# Patient Record
Sex: MALE | Race: WHITE
[De-identification: names, ages, dates, MRNs, and addresses within clinical notes are randomized per-mention and may not be internally consistent; named-entity substitution may affect disease eponyms.]

---

## 2020-05-21 ENCOUNTER — HOSPITAL ENCOUNTER (OUTPATIENT)
Dept: HOSPITAL 47 - EC | Age: 40
Setting detail: OBSERVATION
LOS: 1 days | Discharge: HOME | End: 2020-05-22
Attending: FAMILY MEDICINE | Admitting: FAMILY MEDICINE
Payer: COMMERCIAL

## 2020-05-21 DIAGNOSIS — K76.0: ICD-10-CM

## 2020-05-21 DIAGNOSIS — E66.9: ICD-10-CM

## 2020-05-21 DIAGNOSIS — Z86.14: ICD-10-CM

## 2020-05-21 DIAGNOSIS — R74.8: ICD-10-CM

## 2020-05-21 DIAGNOSIS — I10: ICD-10-CM

## 2020-05-21 DIAGNOSIS — Z91.041: ICD-10-CM

## 2020-05-21 DIAGNOSIS — R16.0: ICD-10-CM

## 2020-05-21 DIAGNOSIS — Z91.048: ICD-10-CM

## 2020-05-21 DIAGNOSIS — R74.0: ICD-10-CM

## 2020-05-21 DIAGNOSIS — F17.210: ICD-10-CM

## 2020-05-21 DIAGNOSIS — K80.00: Primary | ICD-10-CM

## 2020-05-21 DIAGNOSIS — Z98.890: ICD-10-CM

## 2020-05-21 PROCEDURE — 80053 COMPREHEN METABOLIC PANEL: CPT

## 2020-05-21 PROCEDURE — 96374 THER/PROPH/DIAG INJ IV PUSH: CPT

## 2020-05-21 PROCEDURE — 99285 EMERGENCY DEPT VISIT HI MDM: CPT

## 2020-05-21 PROCEDURE — 96376 TX/PRO/DX INJ SAME DRUG ADON: CPT

## 2020-05-21 PROCEDURE — 85025 COMPLETE CBC W/AUTO DIFF WBC: CPT

## 2020-05-21 PROCEDURE — 96375 TX/PRO/DX INJ NEW DRUG ADDON: CPT

## 2020-05-21 PROCEDURE — 80074 ACUTE HEPATITIS PANEL: CPT

## 2020-05-21 PROCEDURE — 87635 SARS-COV-2 COVID-19 AMP PRB: CPT

## 2020-05-21 PROCEDURE — 96361 HYDRATE IV INFUSION ADD-ON: CPT

## 2020-05-21 PROCEDURE — 83690 ASSAY OF LIPASE: CPT

## 2020-05-21 NOTE — ED
Abdominal Pain HPI





- General


Chief Complaint: Abdominal Pain


Stated Complaint: Poss Gallbladder Stones


Time Seen by Provider: 05/21/20 20:38


Source: patient


Mode of arrival: ambulatory


Limitations: no limitations





- History of Present Illness


Initial Comments: 


40-year-old male patient presents to the emergency department today for 

evaluation of midepigastric and right upper quadrant abdominal pain.  Patient 

states the pain has been present for the last 2-3 days.  States he did see his 

doctor who ordered outpatient labs and ultrasound which she did have performed 

earlier today.  Patient states he was called by his doctor and told that he had 

abnormal lab results and ultrasound and instructed him to come to a facility 

with a surgeon.  Patient states he has been feeling nauseated but has not 

vomited.  States he was having light-colored stools.  Denies any constipation or

diarrhea.  He denies any fever or chills with this.  He denies history of ab

dominal surgery. Patient denies any recent rash, cough, shortness of breath, 

chest pain, back pain, numbness, tingling, dizziness, weakness, hematuria, 

dysuria, urinary urgency, urinary frequency, headache, visual changes, or any 

other complaints.








- Related Data


                                Home Medications











 Medication  Instructions  Recorded  Confirmed


 


ALPRAZolam [Xanax] 0.25 mg PO BID PRN 05/21/20 05/21/20











                                    Allergies











Allergy/AdvReac Type Severity Reaction Status Date / Time


 


Iodinated Contrast Media Allergy  Anaphylaxis Verified 05/21/20 22:30


 


iodine Allergy  Anaphylaxis Verified 05/21/20 22:30














Review of Systems


ROS Statement: 


Those systems with pertinent positive or pertinent negative responses have been 

documented in the HPI.





ROS Other: All systems not noted in ROS Statement are negative.





Past Medical History


Past Medical History: Hypertension


History of Any Multi-Drug Resistant Organisms: MRSA


Date of last positivie culture/infection: 02 /2020


MDRO Source:: rectal


Additional Past Surgical History / Comment(s): rectal surgery


Past Psychological History: No Psychological Hx Reported


Smoking Status: Current every day smoker


Past Alcohol Use History: None Reported


Past Drug Use History: Marijuana





General Exam


Limitations: no limitations


General appearance: alert, in no apparent distress, other (This is a well-

developed, well-nourished adult male patient in no acute distress.  Vital signs 

upon presentation are temperature 98.7F, pulse 102)


ENT exam: Present: normal exam, normal oropharynx, mucous membranes moist


Respiratory exam: Present: normal lung sounds bilaterally.  Absent: respiratory 

distress, wheezes, rales, rhonchi, stridor


Cardiovascular Exam: Present: regular rate, normal rhythm, normal heart sounds. 

 Absent: systolic murmur, diastolic murmur, rubs, gallop, clicks


GI/Abdominal exam: Present: soft, tenderness (Midepigastric and right upper quad

rant tenderness.), normal bowel sounds, other (Negative Barragan sign.).  Absent: 

distended, guarding, rebound, rigid


Neurological exam: Present: alert, oriented X3, CN II-XII intact


Psychiatric exam: Present: normal affect, normal mood


Skin exam: Present: warm, dry, intact, normal color.  Absent: rash





Course


                                   Vital Signs











  05/21/20 05/21/20





  20:21 22:12


 


Temperature 98.7 F 


 


Pulse Rate 102 H 88


 


Respiratory 20 18





Rate  


 


Blood Pressure 161/82 131/78


 


O2 Sat by Pulse 97 95





Oximetry  














Medical Decision Making





- Medical Decision Making


40-year-old male patient presents to the emergency department today for evaluati

on of upper abdominal pain, midepigastric and right upper quadrant.  Physical 

examination did reveal tenderness over the midepigastric and right upper 

quadrant regions.  Patient did have labs and ultrasound performed outpatient at 

[facility] this morning. I did obtain records, US showed multiple 

gallstones, gall bladder wall is upper limits of normal. Hepatomegaly and 

hepatic steatosis. Labs were reviewed and showed elevated wbc count at 14.6, AST

 252, , Alk phos 145. Patient will be admitted to the hospital for 

further evaluation of his abdominal pain and repeat labs in the morning. 








Disposition


Clinical Impression: 


 Abdominal pain, Cholelithiasis





Disposition: ADMITTED AS IP TO THIS HOSP


Condition: Serious


Decision to Admit Reason: Admit from EC


Decision Date: 05/21/20


Decision Time: 22:08

## 2020-05-22 VITALS
RESPIRATION RATE: 18 BRPM | HEART RATE: 82 BPM | DIASTOLIC BLOOD PRESSURE: 62 MMHG | TEMPERATURE: 98.5 F | SYSTOLIC BLOOD PRESSURE: 104 MMHG

## 2020-05-22 LAB
ALBUMIN SERPL-MCNC: 3.8 G/DL (ref 3.5–5)
ALP SERPL-CCNC: 111 U/L (ref 38–126)
ALT SERPL-CCNC: 283 U/L (ref 4–49)
ANION GAP SERPL CALC-SCNC: 5 MMOL/L
AST SERPL-CCNC: 104 U/L (ref 17–59)
BASOPHILS # BLD AUTO: 0 K/UL (ref 0–0.2)
BASOPHILS NFR BLD AUTO: 0 %
BUN SERPL-SCNC: 13 MG/DL (ref 9–20)
CALCIUM SPEC-MCNC: 9.2 MG/DL (ref 8.4–10.2)
CHLORIDE SERPL-SCNC: 104 MMOL/L (ref 98–107)
CO2 SERPL-SCNC: 30 MMOL/L (ref 22–30)
EOSINOPHIL # BLD AUTO: 0.2 K/UL (ref 0–0.7)
EOSINOPHIL NFR BLD AUTO: 2 %
ERYTHROCYTE [DISTWIDTH] IN BLOOD BY AUTOMATED COUNT: 4.98 M/UL (ref 4.3–5.9)
ERYTHROCYTE [DISTWIDTH] IN BLOOD: 13.4 % (ref 11.5–15.5)
GLUCOSE SERPL-MCNC: 104 MG/DL (ref 74–99)
HCT VFR BLD AUTO: 48.2 % (ref 39–53)
HGB BLD-MCNC: 15.4 GM/DL (ref 13–17.5)
LYMPHOCYTES # SPEC AUTO: 2.5 K/UL (ref 1–4.8)
LYMPHOCYTES NFR SPEC AUTO: 18 %
MCH RBC QN AUTO: 30.9 PG (ref 25–35)
MCHC RBC AUTO-ENTMCNC: 31.9 G/DL (ref 31–37)
MCV RBC AUTO: 96.8 FL (ref 80–100)
MONOCYTES # BLD AUTO: 0.7 K/UL (ref 0–1)
MONOCYTES NFR BLD AUTO: 5 %
NEUTROPHILS # BLD AUTO: 9.9 K/UL (ref 1.3–7.7)
NEUTROPHILS NFR BLD AUTO: 72 %
PLATELET # BLD AUTO: 244 K/UL (ref 150–450)
POTASSIUM SERPL-SCNC: 5 MMOL/L (ref 3.5–5.1)
PROT SERPL-MCNC: 7 G/DL (ref 6.3–8.2)
SODIUM SERPL-SCNC: 139 MMOL/L (ref 137–145)
WBC # BLD AUTO: 13.9 K/UL (ref 3.8–10.6)

## 2020-05-22 NOTE — DS
DISCHARGE SUMMARY



CHIEF COMPLAINT:

Abdominal pain.



HISTORY OF PRESENT ILLNESS AND PHYSICAL EXAMINATION:

Details of this man's history and physical can be found in the initial workup.



LABORATORY STUDIES:

While he was in the hospital he had laboratory studies, details of which can be found

in the laboratory section of his chart.



COURSE IN THE HOSPITAL:

After admission he was placed on bedrest and started on intravenous fluids, and he was

kept n.p.o. He was seen by Surgery.  It was confirmed that he had cholecystitis and

cholelithiasis.  Surgery felt that he could be discharged home and undergo surgery in

the near future as an outpatient.



FINAL DIAGNOSIS:

Acute cholecystitis with cholelithiasis.



OPERATIONS:

None.



CONSULTATION:

General Surgery.



He is improved.





MMODL / IJN: [id number] / Job#: [id number]

## 2020-05-22 NOTE — P.GSCN
<Naomy Stewart - Last Filed: 05/22/20 09:19>





History of Present Illness


Consult date: 05/22/20


Reason for Consult: 





abdominal pain





Requesting physician: Ledy Guzman


History of present illness: 





CHIEF COMPLAINT: Abdominal pain





HISTORY OF PRESENT ILLNESS: 40-year-old male who began having epigastric and 

right upper quadrant abdominal pain on Wednesday.  Patient states he had Abbie's

and about 15 minutes after he began having discomfort.  He states he went to lay

down for a few hours and the pain did get a little better.  He states the next 

day he woke up and had a light colored bowel movement which worried him.  He 

contacted his physician who ordered an ultrasound and blood work.  The patient 

reports having this discomfort intermittently for approximately one year.  He 

does report the pain seems to correspond to eating greasy or fatty foods. Denies

dark urine. Reports very rare alcohol use. Denies history of hepatitis.





PAST MEDICAL HISTORY: 


See list.





PAST SURGICAL HISTORY: 


See list.





SOCIAL HISTORY: No illicit drug use.  





REVIEW OF SYSTEMS: 


CONSTITUTIONAL: Denies fever or chills.


HEENT: Denies blurred vision, vision changes, or eye pain. Denies hemoptysis 


CARDIOVASCULAR: Denies chest pain or pressure.


RESPIRATORY: No shortness of breath. 


GASTROINTESTINAL: Refer to HPI for pertinent findings


HEMATOLOGIC: Denies bleeding disorders.


GENITOURINARY:  Denies any blood in urine.


SKIN: Denies pruitis. Denies rash.





PHYSICAL EXAM: 


VITAL SIGNS: Reviewed.


GENERAL: Well-developed in no acute distress. 


HEENT:  No sclera icterus. Extraocular movements grossly intact.  Moist buccal 

mucosa. Head is atraumatic, normocephalic. 


ABDOMEN:  Soft.  Nondistended.  Mild tenderness upon palpation of right upper 

quadrant.


NEUROLOGIC: Alert and oriented. Cranial nerves II through XII grossly intact.





LABORATORY DATA:


Laboratory data from outside facility reveals white count 14.6.  Hemoglobin 

15.9.  Platelet count 264.


.  .  Bilirubin 0.4.


Amylase and lipase within normal limits.





IMAGING:


Abdominal ultrasound: Gallbladder full of shadowing gallstones.  Largest 

measuring approximately 1.7 cm.  Gallbladder wall on the upper normal thickness 

at 0.3 cm.  No pericholecystic fluid.  No dilation of the common bile duct.





ASSESSMENT: 


1.  Abdominal pain


2.  Cholelithiasis, transaminitis, possible acute cholecystitis





PLAN: 


NPO. Continue IV fluids at 100cc/hr


Zosyn Q8 hours. Monitor CBC


Await repeat CMP 


Plan for lap jason. Timing yet to be determined.





Nurse practitioner note has been reviewed by physician. Signing provider agrees 

with the documented findings, assessment, and plan of care. 








Past Medical History


Past Medical History: Hypertension


History of Any Multi-Drug Resistant Organisms: MRSA


Year Discovered:: 02 /2020


MDRO Source:: rectal


Additional Past Surgical History / Comment(s): Rectal surgery - polynatal cyst 

on tailbone, hernia repair as a kid


Past Psychological History: No Psychological Hx Reported


Smoking Status: Current every day smoker


Past Alcohol Use History: None Reported


Past Drug Use History: Marijuana





- Past Family History


  ** Father


History Unknown: Yes





Medications and Allergies


                                Home Medications











 Medication  Instructions  Recorded  Confirmed  Type


 


ALPRAZolam [Xanax] 0.25 mg PO BID PRN 05/21/20 05/21/20 History


 


Amoxicillin/Potassium Clav 1 tab PO BID 7 Days #14 tab 05/22/20  Rx





[Augmentin 875-125 Tablet]    








                                    Allergies











Allergy/AdvReac Type Severity Reaction Status Date / Time


 


Iodinated Contrast Media Allergy  Anaphylaxis Verified 05/21/20 22:30


 


iodine Allergy  Anaphylaxis Verified 05/21/20 22:30














Surgical - Exam


                                   Vital Signs











Temp Pulse Resp BP Pulse Ox


 


 98.7 F   102 H  20   161/82   97 


 


 05/21/20 20:21  05/21/20 20:21  05/21/20 20:21  05/21/20 20:21  05/21/20 20:21














<Ismael Nelson - Last Filed: 05/22/20 13:41>





History of Present Illness


History of present illness: 


As above.  Patient with elevated liver enzymes suspicious for 

choledocholithiasis.  Feels much better today.  He notices urine was light 

colored yesterday.  Did not appreciate the color of his urine.  History of 

intermittent episodes happening almost monthly.  Ultrasound shows gallstones 

with a normal bile duct.  Today's liver enzymes improved.  Both alkaline 

phosphatase and bilirubin normal.  Options reviewed with the patient.  He would 

like to go home today if possible and return next week for elective 

cholecystectomy.  I think that is reasonable given the improvement in his liver 

enzymes and his improvement in symptoms.  Will schedule for elective 

laparoscopic cholecystectomy Tuesday.  We'll repeat CMP on arrival. Risks of 

bleeding, infection, bile leak, bile duct injury, retained common bile duct 

stone, trocar injury, conversion to an open procedure, hernia, anesthesia 

related complications were reviewed.  The patient understands and wishes to 

proceed.








Surgical - Exam


                                   Vital Signs











Temp Pulse Resp BP Pulse Ox


 


 98.7 F   102 H  20   161/82   97 


 


 05/21/20 20:21  05/21/20 20:21  05/21/20 20:21  05/21/20 20:21  05/21/20 20:21














Results





- Labs





                                 05/22/20 09:23





                                 05/22/20 09:23


                  Abnormal Lab Results - Last 24 Hours (Table)











  05/22/20 05/22/20 Range/Units





  09:23 09:23 


 


WBC  13.9 H   (3.8-10.6)  k/uL


 


Neutrophils #  9.9 H   (1.3-7.7)  k/uL


 


Glucose   104 H  (74-99)  mg/dL


 


AST   104 H  (17-59)  U/L


 


ALT   283 H  (4-49)  U/L








                                 Diabetes panel











  05/22/20 Range/Units





  09:23 


 


Sodium  139  (137-145)  mmol/L


 


Potassium  5.0  (3.5-5.1)  mmol/L


 


Chloride  104  ()  mmol/L


 


Carbon Dioxide  30  (22-30)  mmol/L


 


BUN  13  (9-20)  mg/dL


 


Creatinine  0.77  (0.66-1.25)  mg/dL


 


Glucose  104 H  (74-99)  mg/dL


 


Calcium  9.2  (8.4-10.2)  mg/dL


 


AST  104 H  (17-59)  U/L


 


ALT  283 H  (4-49)  U/L


 


Alkaline Phosphatase  111  ()  U/L


 


Total Protein  7.0  (6.3-8.2)  g/dL


 


Albumin  3.8  (3.5-5.0)  g/dL








                                  Calcium panel











  05/22/20 Range/Units





  09:23 


 


Calcium  9.2  (8.4-10.2)  mg/dL


 


Albumin  3.8  (3.5-5.0)  g/dL








                                 Pituitary panel











  05/22/20 Range/Units





  09:23 


 


Sodium  139  (137-145)  mmol/L


 


Potassium  5.0  (3.5-5.1)  mmol/L


 


Chloride  104  ()  mmol/L


 


Carbon Dioxide  30  (22-30)  mmol/L


 


BUN  13  (9-20)  mg/dL


 


Creatinine  0.77  (0.66-1.25)  mg/dL


 


Glucose  104 H  (74-99)  mg/dL


 


Calcium  9.2  (8.4-10.2)  mg/dL








                                  Adrenal panel











  05/22/20 Range/Units





  09:23 


 


Sodium  139  (137-145)  mmol/L


 


Potassium  5.0  (3.5-5.1)  mmol/L


 


Chloride  104  ()  mmol/L


 


Carbon Dioxide  30  (22-30)  mmol/L


 


BUN  13  (9-20)  mg/dL


 


Creatinine  0.77  (0.66-1.25)  mg/dL


 


Glucose  104 H  (74-99)  mg/dL


 


Calcium  9.2  (8.4-10.2)  mg/dL


 


Total Bilirubin  0.5  (0.2-1.3)  mg/dL


 


AST  104 H  (17-59)  U/L


 


ALT  283 H  (4-49)  U/L


 


Alkaline Phosphatase  111  ()  U/L


 


Total Protein  7.0  (6.3-8.2)  g/dL


 


Albumin  3.8  (3.5-5.0)  g/dL

## 2020-05-22 NOTE — HP
HISTORY AND PHYSICAL



CHIEF COMPLAINT:

Abdominal pain.



HISTORY OF PRESENT ILLNESS:

This is the first known admission for this 40-year-old obese male.  He states he

presented to the emergency room after a 2- or 3-day history of upper abdominal pain

which was starting to get worse.  He then developed nausea and vomiting.  When he came

to the emergency room there was a suggestion that this was cholecystitis with

cholelithiasis, and he was admitted.



REVIEW OF SYSTEMS:

He has had no neurologic problems, headaches, difficulty with vision or hearing, chest

pain, shortness of breath, cough, pulmonary disease, hypertension, palpitations, heart

disease, murmurs, rheumatic fever, orthopnea, PND, hematemesis, melena, hematochezia,

cirrhosis, jaundice, etc.  He did notice that he had a "gray stool."  He has had no

renal failure, renal disease, hematuria, frequency, urgency, dysuria, incontinence,

diabetes, etc.



Past medical history, family history, and personal and social histories are

unremarkable and noncontributory otherwise. The only medication he is on is Xanax.  He

is ALLERGIC TO IODINE.  The only surgery he has had is 3 different procedures for a

pilonidal cyst.  He smokes a pack of cigarettes a day.



PHYSICAL EXAMINATION:

Blood pressure 145/85 with a pulse of 69, respirations of 32, and he is afebrile. In

general he appeared to be obese.  Skin was slightly pale.  There was no jaundice.

Head, ears, eyes, nose, mouth and throat were normal.  The chest was clear.  Cardiac

exam was normal. The abdomen was protuberant.  He was tender over the epigastrium.

There were no definite masses. Bowel sounds were present. Extremities were normal.

Neurologically he was intact.



He is admitted to the hospital with the diagnoses:

1. Upper abdominal pain, etiology unknown.

2. Rule out gallbladder disease, pancreatitis or other etiology.



PLAN:

1. Bed rest.

2. IV fluids.

3. Follow-up abdominal exam.

4. Consult with General Surgery.





MMODL / IJN: [id number] / Job#: [id number]

## 2020-05-26 ENCOUNTER — HOSPITAL ENCOUNTER (OUTPATIENT)
Dept: HOSPITAL 47 - OR | Age: 40
Discharge: HOME | End: 2020-05-26
Attending: SURGERY
Payer: COMMERCIAL

## 2020-05-26 VITALS — HEART RATE: 91 BPM | DIASTOLIC BLOOD PRESSURE: 61 MMHG | SYSTOLIC BLOOD PRESSURE: 104 MMHG

## 2020-05-26 VITALS — RESPIRATION RATE: 16 BRPM

## 2020-05-26 DIAGNOSIS — Z86.14: ICD-10-CM

## 2020-05-26 DIAGNOSIS — Z91.048: ICD-10-CM

## 2020-05-26 DIAGNOSIS — K80.10: Primary | ICD-10-CM

## 2020-05-26 DIAGNOSIS — G47.33: ICD-10-CM

## 2020-05-26 DIAGNOSIS — I10: ICD-10-CM

## 2020-05-26 DIAGNOSIS — F17.200: ICD-10-CM

## 2020-05-26 DIAGNOSIS — Z98.890: ICD-10-CM

## 2020-05-26 DIAGNOSIS — E66.9: ICD-10-CM

## 2020-05-26 DIAGNOSIS — Z91.041: ICD-10-CM

## 2020-05-26 LAB
ALBUMIN SERPL-MCNC: 4.5 G/DL (ref 3.5–5)
ALP SERPL-CCNC: 102 U/L (ref 38–126)
ALT SERPL-CCNC: 81 U/L (ref 4–49)
ANION GAP SERPL CALC-SCNC: 11 MMOL/L
AST SERPL-CCNC: 32 U/L (ref 17–59)
BUN SERPL-SCNC: 20 MG/DL (ref 9–20)
CALCIUM SPEC-MCNC: 10 MG/DL (ref 8.4–10.2)
CHLORIDE SERPL-SCNC: 104 MMOL/L (ref 98–107)
CO2 SERPL-SCNC: 27 MMOL/L (ref 22–30)
GLUCOSE SERPL-MCNC: 112 MG/DL (ref 74–99)
POTASSIUM SERPL-SCNC: 4.9 MMOL/L (ref 3.5–5.1)
PROT SERPL-MCNC: 8.1 G/DL (ref 6.3–8.2)
SODIUM SERPL-SCNC: 142 MMOL/L (ref 137–145)

## 2020-05-26 PROCEDURE — 47562 LAPAROSCOPIC CHOLECYSTECTOMY: CPT

## 2020-05-26 PROCEDURE — 88304 TISSUE EXAM BY PATHOLOGIST: CPT

## 2020-05-26 PROCEDURE — 80053 COMPREHEN METABOLIC PANEL: CPT

## 2020-05-26 NOTE — P.GSHP
History of Present Illness


H&P Date: 05/26/20


Chief Complaint: Choledocholithiasis





Please refer to consult from last week.  Patient was in the hospital with right 

upper quadrant and epigastric pain.  Was found to have elevated liver enzymes.  

Liver enzymes were improving.  Patient's pain was likewise resolving.  He was 

discharged home with plans for outpatient cholecystectomy.  Returns to the 

hospital today for laparoscopic cholecystectomy.  Pain still improved.  Labs 

from today are pending.  Previous workup showed multiple gallstones on 

ultrasound.  Hepatitis studies negative.





Past Medical History


Past Medical History: Hypertension


History of Any Multi-Drug Resistant Organisms: MRSA


Date of last positivie culture/infection: 02 /2020


MDRO Source:: rectal


Additional Past Surgical History / Comment(s): Rectal surgery - polynatal cyst 

on tailbone, hernia repair as a kid


Past Psychological History: No Psychological Hx Reported


Smoking Status: Current every day smoker


Past Alcohol Use History: None Reported


Past Drug Use History: Marijuana





- Past Family History


  ** Father


History Unknown: Yes





Medications and Allergies


                                Home Medications











 Medication  Instructions  Recorded  Confirmed  Type


 


ALPRAZolam [Xanax] 0.25 mg PO BID PRN 05/21/20 05/21/20 History


 


Amoxicillin/Potassium Clav 1 tab PO BID 7 Days #14 tab 05/22/20  Rx





[Augmentin 875-125 Tablet]    








                                    Allergies











Allergy/AdvReac Type Severity Reaction Status Date / Time


 


Iodinated Contrast Media Allergy  Anaphylaxis Verified 05/21/20 22:30


 


iodine Allergy  Anaphylaxis Verified 05/21/20 22:30














Surgical - Exam








Physical exam:


General: Well-developed, well-nourished


HEENT: Normocephalic, sclerae nonicteric


Abdomen: Nontender, nondistended


Extremities: No edema


Neuro: Alert and oriented





Assessment and Plan


(1) Choledocholithiasis


Narrative/Plan: 


40-year-old male with suspected choledocholithiasis.  Check CMP level at this 

time.  Proceed with laparoscopic, possible open cholecystectomy at this time. 

Risks of bleeding, infection, bile leak, bile duct injury, retained common bile 

duct stone, trocar injury, conversion to an open procedure, hernia, anesthesia 

related complications were reviewed.  The patient understands and wishes to 

proceed.


Current Visit: Yes   Status: Acute   Code(s): K80.50 - CALCULUS OF BILE DUCT W/O

CHOLANGITIS OR CHOLECYST W/O OBST   SNOMED Code(s): 627029184

## 2020-05-26 NOTE — P.OP
Date of Procedure: 05/26/20


Procedure(s) Performed: 


PREOPERATIVE DIAGNOSIS: Choledocholithiasis


POSTOPERATIVE DIAGNOSIS: Same


PROCEDURE: Laparoscopic cholecystectomy


SURGEON: Omar


EBL: Minimal see anesthesia record


ANESTHESIA: Gen.


COMPLICATIONS: None


OPERATIVE PROCEDURE: The patient was brought and placed on the operating room 

table in the supine position.  The patient was placed under general anesthesia 

at that time.  The abdomen was prepped and draped in the usual sterile fashion. 

A small horizontal supraumbilical incision was made.  The fascia was grasped 

with the Kocher forceps.  The fascia was retracted anteriorly.  The Veress 

needle was advanced into the peritoneal cavity.  The saline drop test was 

normal.  Insufflation took place up to 15 mmHg.  A 5 mm optical trocar was 

advanced and the peritoneal cavity.  2 additional 5 mm trochars were placed in 

the right upper quadrant under direct visualization.  A 12 mm trocar was 

advanced into the epigastric incision site.  The patient's liver was quite 

large.  We could not visualize stomach or liver given these significant volume 

of intraperitoneal omental fat.  An additional 5 mm trocar was utilized in the 

left supraumbilical location to retract the omental fat inferiorly and 

posteriorly.  The gallbladder was retracted superiorly and laterally.  The 

peritoneum overlying the infundibulum was bluntly dissected.  The patient's 

cystic duct was visualized.  The junction between the cystic duct common and 

hepatic duct was identified.  The cystic duct was then divided after placement 

of 3 12 mm clips on the patient's side and one on the specimen side.  The cystic

artery was identified and clipped as well.  A small vessel was seen along the 

gallbladder fossa and clipped as well.  The gallbladder was then removed from 

the liver bed using both electrocautery and the LigaSure device.  The 

gallbladder was then removed from the epigastric trocar site with an Endo Catch 

bag.  The gallbladder fossa was irrigated with saline.  There was no evidence of

any bleeding or biliary drainage seen.  The fascia at the 12 millimeter site was

closed using a Wm-Jj 0 Vicryl stitch.  The trochars were then removed.

 The skin at all 5 sites was closed using a 4-0 Monocryl stitch.  Skin glue was 

utilized on the incision sites.  At the end of this procedure the sponge and 

needle counts were correct.


DISPOSITION: Stable to the recovery room

## 2020-08-30 ENCOUNTER — HOSPITAL ENCOUNTER (INPATIENT)
Dept: HOSPITAL 47 - 3SCARD | Age: 40
LOS: 3 days | Discharge: HOME | DRG: 247 | End: 2020-09-02
Attending: INTERNAL MEDICINE | Admitting: INTERNAL MEDICINE
Payer: COMMERCIAL

## 2020-08-30 VITALS — BODY MASS INDEX: 51.2 KG/M2

## 2020-08-30 DIAGNOSIS — Z87.892: ICD-10-CM

## 2020-08-30 DIAGNOSIS — I11.9: ICD-10-CM

## 2020-08-30 DIAGNOSIS — G47.30: ICD-10-CM

## 2020-08-30 DIAGNOSIS — E66.01: ICD-10-CM

## 2020-08-30 DIAGNOSIS — D72.829: ICD-10-CM

## 2020-08-30 DIAGNOSIS — E78.5: ICD-10-CM

## 2020-08-30 DIAGNOSIS — I25.10: ICD-10-CM

## 2020-08-30 DIAGNOSIS — Z98.890: ICD-10-CM

## 2020-08-30 DIAGNOSIS — Z71.3: ICD-10-CM

## 2020-08-30 DIAGNOSIS — F17.210: ICD-10-CM

## 2020-08-30 DIAGNOSIS — Z87.19: ICD-10-CM

## 2020-08-30 DIAGNOSIS — Z86.14: ICD-10-CM

## 2020-08-30 DIAGNOSIS — N17.9: ICD-10-CM

## 2020-08-30 DIAGNOSIS — Z71.6: ICD-10-CM

## 2020-08-30 DIAGNOSIS — I21.4: Primary | ICD-10-CM

## 2020-08-30 DIAGNOSIS — Z91.041: ICD-10-CM

## 2020-08-30 LAB
APTT BLD: 88 SEC (ref 22–30)
BASOPHILS # BLD AUTO: 0.1 K/UL (ref 0–0.2)
BASOPHILS NFR BLD AUTO: 1 %
CHOLEST SERPL-MCNC: 227 MG/DL (ref ?–200)
EOSINOPHIL # BLD AUTO: 0.5 K/UL (ref 0–0.7)
EOSINOPHIL NFR BLD AUTO: 3 %
ERYTHROCYTE [DISTWIDTH] IN BLOOD BY AUTOMATED COUNT: 5.51 M/UL (ref 4.3–5.9)
ERYTHROCYTE [DISTWIDTH] IN BLOOD: 13.4 % (ref 11.5–15.5)
HCT VFR BLD AUTO: 52.1 % (ref 39–53)
HDLC SERPL-MCNC: 25 MG/DL (ref 40–60)
HGB BLD-MCNC: 16.4 GM/DL (ref 13–17.5)
INR PPP: 1 (ref ?–1.2)
LDLC SERPL CALC-MCNC: 170 MG/DL (ref 0–99)
LYMPHOCYTES # SPEC AUTO: 3.5 K/UL (ref 1–4.8)
LYMPHOCYTES NFR SPEC AUTO: 22 %
MCH RBC QN AUTO: 29.7 PG (ref 25–35)
MCHC RBC AUTO-ENTMCNC: 31.4 G/DL (ref 31–37)
MCV RBC AUTO: 94.6 FL (ref 80–100)
MONOCYTES # BLD AUTO: 1 K/UL (ref 0–1)
MONOCYTES NFR BLD AUTO: 7 %
NEUTROPHILS # BLD AUTO: 10.4 K/UL (ref 1.3–7.7)
NEUTROPHILS NFR BLD AUTO: 66 %
PLATELET # BLD AUTO: 293 K/UL (ref 150–450)
PT BLD: 10.7 SEC (ref 9–12)
TRIGL SERPL-MCNC: 161 MG/DL (ref ?–150)
WBC # BLD AUTO: 15.9 K/UL (ref 3.8–10.6)

## 2020-08-30 PROCEDURE — 80048 BASIC METABOLIC PNL TOTAL CA: CPT

## 2020-08-30 PROCEDURE — 93306 TTE W/DOPPLER COMPLETE: CPT

## 2020-08-30 PROCEDURE — 85610 PROTHROMBIN TIME: CPT

## 2020-08-30 PROCEDURE — B2111ZZ FLUOROSCOPY OF MULTIPLE CORONARY ARTERIES USING LOW OSMOLAR CONTRAST: ICD-10-PCS

## 2020-08-30 PROCEDURE — 71045 X-RAY EXAM CHEST 1 VIEW: CPT

## 2020-08-30 PROCEDURE — 85379 FIBRIN DEGRADATION QUANT: CPT

## 2020-08-30 PROCEDURE — 83735 ASSAY OF MAGNESIUM: CPT

## 2020-08-30 PROCEDURE — 84484 ASSAY OF TROPONIN QUANT: CPT

## 2020-08-30 PROCEDURE — 027034Z DILATION OF CORONARY ARTERY, ONE ARTERY WITH DRUG-ELUTING INTRALUMINAL DEVICE, PERCUTANEOUS APPROACH: ICD-10-PCS

## 2020-08-30 PROCEDURE — 85730 THROMBOPLASTIN TIME PARTIAL: CPT

## 2020-08-30 PROCEDURE — 93454 CORONARY ARTERY ANGIO S&I: CPT

## 2020-08-30 PROCEDURE — 80061 LIPID PANEL: CPT

## 2020-08-30 PROCEDURE — 85025 COMPLETE CBC W/AUTO DIFF WBC: CPT

## 2020-08-30 PROCEDURE — 4A023N7 MEASUREMENT OF CARDIAC SAMPLING AND PRESSURE, LEFT HEART, PERCUTANEOUS APPROACH: ICD-10-PCS

## 2020-08-30 RX ADMIN — HEPARIN SODIUM SCH MLS/HR: 10000 INJECTION, SOLUTION INTRAVENOUS at 14:44

## 2020-08-30 RX ADMIN — METOPROLOL TARTRATE SCH MG: 25 TABLET, FILM COATED ORAL at 20:10

## 2020-08-30 RX ADMIN — FAMOTIDINE SCH MG: 10 INJECTION, SOLUTION INTRAVENOUS at 20:10

## 2020-08-30 RX ADMIN — METOPROLOL TARTRATE SCH MG: 25 TABLET, FILM COATED ORAL at 16:43

## 2020-08-30 RX ADMIN — HEPARIN SODIUM SCH: 10000 INJECTION, SOLUTION INTRAVENOUS at 14:43

## 2020-08-30 RX ADMIN — NICOTINE SCH PATCH: 21 PATCH, EXTENDED RELEASE TRANSDERMAL at 16:43

## 2020-08-30 NOTE — P.CRDCN
History of Present Illness


History of present illness: 





tightness in the chest through to the back and into the left arm and shoulder. 

happened after he laid down to sleep this morning after work. lasted 

approximately 5 minutes. 


HISTORY OF PRESENTING ILLNESS


This is a pleasant 40-year-old  male past medical history significant 

for hypertension although not taking antihypertensives, chronic nicotine 

dependence and marijuana use.  He denies prior history of coronary artery 

disease and does not follow with a cardiologist for any reason.  He denies 

family history of premature coronary artery disease. We have been asked to see 

in consultation for chest pain with elevated troponins.  He works the midnight 

shift and states yesterday morning when he got home from work while he laid down

getting ready to fall asleep.  Noticed a tightness in his chest in the 

midsternal region radiating through to his back into the left shoulder and down 

the left arm.  It lasted for approximately 5 minutes and subsided on its own.  

He went to sleep AND went to work last night without incident.  Again this 

morning when he got home from work and lay down to sleep he had a similar 

episode of chest discomfort.  He was on his way to the emergency department and 

the chest pain subsided promptly him to turn back around to go home.  Only been 

home for about 5 minutes he sat down on the couch in his chest pain returned.  

He went back to the hospital for evaluation.  On arrival to the emergency 

department his EKG was unremarkable revealing sinus mechanism with no acute 

ischemic changes.  His troponin value was elevated at 0.112.  He was transferred

here from [facility] for further cardiac evaluation.  He is seen and 

examined resting comfortably laying flat in bed in no acute distress.  He has 

had no further symptoms of chest discomfort since this morning.  He states 

presently 3 years ago he was diagnosed with hypertension and started on 

lisinopril which she took for approximately 6 months and then his blood pressure

improved and this was discontinued according to the patient by his PCP.  He has 

never had a cardiac catheterization or stress testing in the outpatient setting.

 Other pertinent laboratory values reviewed, WBC 15.9, hemoglobin 16.4, 

platelets 293 second troponin obtained here 0.154, , HDL 25, triglyceride

161, total cholesterol 227, magnesium 2.0, sodium 140, potassium 4.3 and 

creatinine of 1.4.





REVIEW OF SYSTEMS


At the time of my exam:


CONSTITUTIONAL: Denies fever or chills.


CARDIOVASCULAR: Denies chest pain, shortness of breath, orthopnea, PND or 

palpitations.


RESPIRATORY: Denies cough. 


GASTROINTESTINAL: Denies abdominal pain, diarrhea, constipation, nausea or 

vomiting.


MUSCULOSKELETAL: Denies myalgias.


NEUROLOGIC: Denies numbness, tingling or weakness.


ENDOCRINE: Denies fatigue, weight change,  polydipsia or polyurina.


GENITOURINARY: Denies burning, hematuria or urgency with micturation.


HEMATOLOGIC: Denies history of anemia or bleeding. 





PHYSICAL EXAMINATION


Blood pressure 120s over 80s heart rate 67 afebrile and maintaining oxygen 

saturation on room air.


CONSTITUTIONAL: No apparent distress.  Morbidly obese.


HEENT: Head is normocephalic. Pupils are equal, round. Sclerae anicteric. Mucous

membranes of the mouth are moist.  No JVD. No carotid bruit.


CHEST EXAMINATION: Lungs are clear to auscultation. No chest wall tenderness is 

noted on palpation or with deep breathing. 


HEART EXAMINATION: Regular rate and rhythm. S1, S2 heard. No murmurs, gallops or

rub.


ABDOMEN: Soft, nontender. Positive bowel sounds.


EXTREMITIES: 2+ peripheral pulses, no lower extremity edema and no calf 

tenderness.


NEUROLOGIC EXAMINATION: Patient is awake, alert and oriented x3. 





ASSESSMENT


Non-ST elevated myocardial infarction


Hypertension


Leukocytosis


Acute kidney injury


Chronic nicotine dependence


Dyslipidemia


Morbid obesity, BMI 47





PLAN


Initiate aspirin 81 mg daily, atorvastatin 80 mg daily and Lopressor 25 mg twice

a day.


Continue heparin infusion and Nitropaste as needed for chest pain.


Recommend proceeding with cardiac catheterization to assess coronary arteries. I

have discussed the risks, benefits and alternative therapies for the above-

mentioned procedure and for both sedation/analgesia as well as necessary blood 

product administration, if indicated, as they pertain to this patient.  The 

patient has indicated understanding and acceptance of the risks and procedures 

discussed. Questions have been answered appropriately and he is agreeable to 

proceed with above stated procedure. 


Obtain 2D echocardiogram and doppler study to assess cardiac structure and 

function. 


Further recommendations to follow based on clinical course. We will start an ACE

inhibitor if his blood pressure will tolerate. 


Smoking cessation recommended. 


Thank you kindly for this consultation.











Nurse Practitioner note has been reviewed, I agree with a documented findings 

and plan of care.  Patient was seen and examined.











Past Medical History


Past Medical History: Hypertension


History of Any Multi-Drug Resistant Organisms: MRSA


Date of last positivie culture/infection: 02 /2020


MDRO Source:: rectal


Additional Past Surgical History / Comment(s): Rectal surgery - polynatal cyst 

on tailbone, hernia repair as a kid


Past Psychological History: No Psychological Hx Reported


Past Alcohol Use History: None Reported


Past Drug Use History: Marijuana





- Past Family History


  ** Father


History Unknown: Yes





Medications and Allergies


                                Home Medications











 Medication  Instructions  Recorded  Confirmed  Type


 


ALPRAZolam [Xanax] 0.25 mg PO BID PRN 05/21/20 08/30/20 History


 


Ibuprofen [Motrin Ib] 800 mg PO Q6H PRN 08/30/20 08/30/20 History








                                    Allergies











Allergy/AdvReac Type Severity Reaction Status Date / Time


 


Iodinated Contrast Media Allergy  Anaphylaxis Verified 08/30/20 13:13


 


iodine Allergy  Anaphylaxis Verified 08/30/20 13:13














Physical Exam


Vitals: 


                                Intake and Output











 08/29/20 08/30/20 08/30/20





 22:59 06:59 14:59


 


Other:   


 


  Weight   142.882 kg














Results





                                 08/30/20 10:20





                                   Coagulation











  08/30/20 Range/Units





  10:20 


 


PT  10.7  (9.0-12.0)  sec


 


APTT  88.0 H  (22.0-30.0)  sec








                                       CBC











  08/30/20 Range/Units





  10:20 


 


WBC  15.9 H  (3.8-10.6)  k/uL


 


RBC  5.51  (4.30-5.90)  m/uL


 


Hgb  16.4  (13.0-17.5)  gm/dL


 


Hct  52.1  (39.0-53.0)  %


 


Plt Count  293  (150-450)  k/uL








                               Current Medications











Generic Name Dose Route Start Last Admin





  Trade Name Freq  PRN Reason Stop Dose Admin


 


Aspirin  81 mg  08/31/20 09:00 





  Aspirin  PO  





  DAILY [facility]  


 


Atorvastatin Calcium  40 mg  08/30/20 10:15 





  Lipitor  PO  





  DAILY [facility]  


 


Heparin Sodium/Sodium Chloride  250 mls @ 10.002 mls/hr  08/30/20 10:00 





  25,000 unit/ Sodium Chloride  IV  





  .Q24H [facility]  





  Protocol  





  7 UNITS/KG/HR  


 


Metoprolol Tartrate  25 mg  08/30/20 10:15 





  Lopressor  PO  





  BID [facility]  








                                Intake and Output











 08/29/20 08/30/20 08/30/20





 22:59 06:59 14:59


 


Other:   


 


  Weight   142.882 kg








                                 Patient Weight











 08/31/20





 06:59


 


Weight 142.882 kg








                                        





                                 08/30/20 10:20

## 2020-08-30 NOTE — P.HPIM
History of Present Illness








This is a pleasant 40-year-old years old male with past medical history of 

hypertension, not on medication.  Cigarette smoker.


Patient wasn't transferred from [facility].  He presents because of 2 

episodes of chest pain that started yesterday and felt like that this in the 

chest, radiating to the throat with left arm numbness, resolved but recurred 

again this morning at 8:00.  Patient described it as nonspecific tightness 

further that pain.  Little bit with dyspnea but no sweating or vomiting or 

palpitation or dizziness.  No fever or change in urine or bowel habits


He smokes about 1 pack per day, smokes marijuana but no illicit drugs





In [facility] the EKG showing normal sinus rhythm with no significant 

ST-T changes, rate is 83 and QTC is 420.


Left showing INR 1.0, d-dimer is elevated at 0.7, BNP is normal at 22.7 Christal 

troponin is elevated at 0.1, magnesium 2.0, sodium 140, potassium 4.3, ALT is 

normal at 37, AST is normal at 24, creatinine is slightly up at 1.4, GFR is 56, 

total bili 0.3


WBC is elevated at 14.5 K, hemoglobin 16.4, platelets 274k, 


He smokes about 1 pack per day, no alcohol or illicit drugs


Labs checked here and showing troponin 0.15, triglycerides and cholesterol are 

elevated, WBCs 15.9 K.











Review of Systems





CONSTITUTIONAL: No fever, no malaise, no fatigue. 


HEENT: No recent visual problems or hearing problems. Denied any sore throat. 


CARDIOVASCULAR: No  orthopnea, PND, no palpitations, no syncope. 


PULMONARY: No shortness of breath, no cough, no hemoptysis. 


GASTROINTESTINAL: No diarrhea, no nausea, no vomiting, no abdominal pain. [place]

ctive bowel sounds. 


NEUROLOGICAL: No headaches, no weakness, no numbness. 


HEMATOLOGICAL: Denies any bleeding or petechiae. 


GENITOURINARY: Denies any burning micturition, frequency, or urgency. 


MUSCULOSKELETAL/RHEUMATOLOGICAL: Denies any joint pain, swelling, or any muscle 

pain. 


ENDOCRINE: Denies any polyuria or polydipsia.











Past Medical History


Past Medical History: Hypertension


History of Any Multi-Drug Resistant Organisms: MRSA


Date of last positivie culture/infection: 02 /2020


MDRO Source:: rectal


Additional Past Surgical History / Comment(s): Rectal surgery - polynatal cyst 

on tailbone, hernia repair as a kid


Past Psychological History: No Psychological Hx Reported


Past Alcohol Use History: None Reported


Past Drug Use History: Marijuana





- Past Family History


  ** Father


History Unknown: Yes





Medications and Allergies


                                Home Medications











 Medication  Instructions  Recorded  Confirmed  Type


 


ALPRAZolam [Xanax] 0.25 mg PO BID PRN 05/21/20 08/30/20 History








                                    Allergies











Allergy/AdvReac Type Severity Reaction Status Date / Time


 


Iodinated Contrast Media Allergy  Anaphylaxis Verified 08/30/20 11:43


 


iodine Allergy  Anaphylaxis Verified 08/30/20 11:43














Physical Exam


Vitals: 


                                Intake and Output











 08/29/20 08/30/20 08/30/20





 22:59 06:59 14:59


 


Other:   


 


  Weight   142.882 kg














-GENERAL: The patient is alert and oriented x3, not in any acute distress.  

Morbid obesity


HEENT: Pupils are round and equally reacting to light. EOMI. No scleral icterus.

 No conjunctival pallor. Normocephalic, atraumatic. No pharyngeal erythema. No 

thyromegaly. 


CARDIOVASCULAR: S1 and S2 present. No murmurs, rubs, or gallops. 


PULMONARY: Chest is clear to auscultation, no wheezing or crackles. 


ABDOMEN: Soft, nontender, nondistended, normoactive bowel sounds. No palpable 

organomegaly. 


MUSCULOSKELETAL: No joint swelling or deformity. 


EXTREMITIES: No cyanosis, clubbing, or pedal edema. 


NEUROLOGICAL: Gross neurological examination did not reveal any focal deficits. 


SKIN: No rashes. No petechiae








Results


CBC & Chem 7: 


                                 08/30/20 10:20





Labs: 


                  Abnormal Lab Results - Last 24 Hours (Table)











  08/30/20 08/30/20 08/30/20 Range/Units





  10:20 10:20 10:21 


 


WBC  15.9 H    (3.8-10.6)  k/uL


 


Neutrophils #  10.4 H    (1.3-7.7)  k/uL


 


APTT   88.0 H   (22.0-30.0)  sec


 


Troponin I    0.154 H*  (0.000-0.034)  ng/mL














Assessment and Plan


Assessment: 





None STEMI


Hypertension


Mild acute kidney injury


Morbid obesity with BMI of 47


Nicotine dependence





























Plan: 





This is a pleasant 40 years old male who presents with non-STEMI, continue with 

heparin drip.  We'll do.  Serial troponin.  Cardiology consult.  We'll check 

echocardiogram.  Continue with aspirin and Lipitor.


Labs and medication were reviewed..  Continue same treatment.  Continue with 

symptomatic treatment.  Resume home medication.  Monitor lytes and vitals.  DVT 

and GI prophylaxis.  Further recommendations of the clinical course of the 

patient


DVT prophylaxis:  heparin


GI Prophylaxis: Pepcid


Prognosis is guarded

## 2020-08-31 LAB
ANION GAP SERPL CALC-SCNC: 5 MMOL/L
APTT BLD: 31.7 SEC (ref 22–30)
BASOPHILS # BLD AUTO: 0.1 K/UL (ref 0–0.2)
BASOPHILS NFR BLD AUTO: 1 %
BUN SERPL-SCNC: 16 MG/DL (ref 9–20)
CALCIUM SPEC-MCNC: 9.5 MG/DL (ref 8.4–10.2)
CHLORIDE SERPL-SCNC: 102 MMOL/L (ref 98–107)
CO2 SERPL-SCNC: 31 MMOL/L (ref 22–30)
D DIMER PPP FEU-MCNC: 0.45 MG/L FEU (ref ?–0.6)
EOSINOPHIL # BLD AUTO: 0.3 K/UL (ref 0–0.7)
EOSINOPHIL NFR BLD AUTO: 2 %
ERYTHROCYTE [DISTWIDTH] IN BLOOD BY AUTOMATED COUNT: 5.25 M/UL (ref 4.3–5.9)
ERYTHROCYTE [DISTWIDTH] IN BLOOD: 13.5 % (ref 11.5–15.5)
GLUCOSE SERPL-MCNC: 95 MG/DL (ref 74–99)
HCT VFR BLD AUTO: 49.9 % (ref 39–53)
HGB BLD-MCNC: 15.6 GM/DL (ref 13–17.5)
LYMPHOCYTES # SPEC AUTO: 2.6 K/UL (ref 1–4.8)
LYMPHOCYTES NFR SPEC AUTO: 19 %
MCH RBC QN AUTO: 29.7 PG (ref 25–35)
MCHC RBC AUTO-ENTMCNC: 31.3 G/DL (ref 31–37)
MCV RBC AUTO: 95 FL (ref 80–100)
MONOCYTES # BLD AUTO: 0.6 K/UL (ref 0–1)
MONOCYTES NFR BLD AUTO: 5 %
NEUTROPHILS # BLD AUTO: 9.4 K/UL (ref 1.3–7.7)
NEUTROPHILS NFR BLD AUTO: 70 %
PLATELET # BLD AUTO: 264 K/UL (ref 150–450)
POTASSIUM SERPL-SCNC: 5 MMOL/L (ref 3.5–5.1)
SODIUM SERPL-SCNC: 138 MMOL/L (ref 137–145)
WBC # BLD AUTO: 13.5 K/UL (ref 3.8–10.6)

## 2020-08-31 RX ADMIN — FAMOTIDINE SCH MG: 20 TABLET, FILM COATED ORAL at 19:45

## 2020-08-31 RX ADMIN — HEPARIN SODIUM SCH MLS/HR: 10000 INJECTION, SOLUTION INTRAVENOUS at 21:20

## 2020-08-31 RX ADMIN — HEPARIN SODIUM SCH MLS/HR: 10000 INJECTION, SOLUTION INTRAVENOUS at 09:37

## 2020-08-31 RX ADMIN — NICOTINE SCH PATCH: 21 PATCH, EXTENDED RELEASE TRANSDERMAL at 05:48

## 2020-08-31 RX ADMIN — METOPROLOL TARTRATE SCH MG: 25 TABLET, FILM COATED ORAL at 19:45

## 2020-08-31 RX ADMIN — FAMOTIDINE SCH MG: 10 INJECTION, SOLUTION INTRAVENOUS at 05:46

## 2020-08-31 RX ADMIN — ATORVASTATIN CALCIUM SCH MG: 80 TABLET, FILM COATED ORAL at 05:45

## 2020-08-31 RX ADMIN — FAMOTIDINE SCH MG: 20 TABLET, FILM COATED ORAL at 09:36

## 2020-08-31 RX ADMIN — METOPROLOL TARTRATE SCH MG: 25 TABLET, FILM COATED ORAL at 05:45

## 2020-08-31 NOTE — P.PN
Subjective








This is a pleasant 40-year-old years old male with past medical history of 

hypertension, not on medication.  Cigarette smoker.


Patient wasn't transferred from [facility].  He presents because of 2 

episodes of chest pain that started yesterday and felt like that this in the 

chest, radiating to the throat with left arm numbness, resolved but recurred 

again this morning at 8:00.  Patient described it as nonspecific tightness 

further that pain.  Little bit with dyspnea but no sweating or vomiting or 

palpitation or dizziness.  No fever or change in urine or bowel habits


He smokes about 1 pack per day, smokes marijuana but no illicit drugs





In [facility] the EKG showing normal sinus rhythm with no significant 

ST-T changes, rate is 83 and QTC is 420.


Left showing INR 1.0, d-dimer is elevated at 0.7, BNP is normal at 22.7 Christal 

troponin is elevated at 0.1, magnesium 2.0, sodium 140, potassium 4.3, ALT is 

normal at 37, AST is normal at 24, creatinine is slightly up at 1.4, GFR is 56, 

total bili 0.3


WBC is elevated at 14.5 K, hemoglobin 16.4, platelets 274k, 


He smokes about 1 pack per day, no alcohol or illicit drugs


Labs checked here and showing troponin 0.15, triglycerides and cholesterol are 

elevated, WBCs 15.9 K.





08/31/2020


Patient is fully awake and oriented, resting in bed comfortably.  No chest pain 

or dyspnea.  No dizziness and no other new complaints.


Vitals are stable, d-dimer is negative at 0.45 and suspicion for pulmonary 

embolism is very low and no need for further workup as it has more risks than b

enefits


Leukocytosis improvement of 13.5 K.  BMP is unremarkable.


Patient is going for cardiac cath today








Review of Systems


CONSTITUTIONAL: No fever, no malaise, no fatigue. 


HEENT: No recent visual problems or hearing problems. Denied any sore throat. 


CARDIOVASCULAR: No  orthopnea, PND, no palpitations, no syncope. 


PULMONARY: No shortness of breath, no cough, no hemoptysis. 


GASTROINTESTINAL: No diarrhea, no nausea, no vomiting, no abdominal pain. 

Normoactive bowel sounds. 


NEUROLOGICAL: No headaches, no weakness, no numbness. 


HEMATOLOGICAL: Denies any bleeding or petechiae. 


GENITOURINARY: Denies any burning micturition, frequency, or urgency. 


MUSCULOSKELETAL/RHEUMATOLOGICAL: Denies any joint pain, swelling, or any muscle 

pain. 


ENDOCRINE: Denies any polyuria or polydipsia.


                               Active Medications











Generic Name Dose Route Start Last Admin





  Trade Name Freq  PRN Reason Stop Dose Admin


 


Alprazolam  0.25 mg  08/30/20 13:36  08/31/20 09:38





  Xanax  PO   0.25 mg





  Q6HR PRN   Administration





  Mild Anxiety  


 


Alprazolam  0.5 mg  08/30/20 13:36 





  Xanax  PO  





  Q6HR PRN  





  Moderate Anxiety  


 


Aspirin  81 mg  09/01/20 09:00 





  Aspirin  PO  





  DAILY [facility]  


 


Aspirin  325 mg  09/01/20 06:00 





  Aspirin  PO  09/01/20 06:01 





  ONCE ONE  


 


Atorvastatin Calcium  80 mg  08/31/20 09:00  08/31/20 05:45





  Lipitor  PO   80 mg





  DAILY [facility]   Administration


 


Atorvastatin Calcium  80 mg  09/01/20 06:00 





  Lipitor  PO  09/01/20 06:01 





  ONCE ONE  


 


Diphenhydramine HCl  25 mg  08/31/20 09:15  08/31/20 09:35





  Benadryl  PO   25 mg





  TID [facility]   Administration


 


Famotidine  20 mg  08/31/20 09:15  08/31/20 09:36





  Pepcid  PO   20 mg





  BID [facility]   Administration


 


Heparin Sodium/Sodium Chloride  250 mls @ 10.002 mls/hr  08/30/20 10:00  

08/31/20 10:47





  25,000 unit/ Sodium Chloride  IV   16 units/kg/hr





  .Q24H [facility]   22.861 mls/hr





    Titration





  Protocol  





  7 UNITS/KG/HR  


 


Sodium Chloride 1,000 ml/ IV  1,000 mls @ 150.2 mls/hr  08/31/20 09:06  08/31/20

09:36





  Solution  IV  08/31/20 15:45  150.2 mls/hr





  .Q6H40M ONE   Administration





  1 ML/KG/HR  


 


Metoprolol Tartrate  25 mg  08/30/20 10:15  08/31/20 05:45





  Lopressor  PO   25 mg





  BID [facility]   Administration


 


Nicotine  1 patch  08/30/20 12:30  08/31/20 05:48





  Habitrol 21mg/24hr Patch  TRANSDERM   1 patch





  DAILY [facility]   Administration


 


Nitroglycerin  0.4 mg  08/31/20 09:06 





  Nitrostat  SUBLINGUAL  





  Q5M PRN  





  Chest Pain  


 


Prednisone  20 mg  08/31/20 09:15  08/31/20 09:35





  PO   20 mg





  QID [facility]   Administration














Objective





- Vital Signs


Vital signs: 


                                   Vital Signs











Temp  97.8 F   08/31/20 08:00


 


Pulse  67   08/31/20 08:00


 


Resp  16   08/31/20 08:00


 


BP  144/90   08/31/20 08:00


 


Pulse Ox  97   08/31/20 08:00








                                 Intake & Output











 08/30/20 08/31/20 08/31/20





 18:59 06:59 18:59


 


Intake Total 522.509 692.396 136.766


 


Output Total 400  


 


Balance 122.509 692.396 136.766


 


Weight 142.882 kg 150.2 kg 


 


Intake:   


 


  Intake, IV Titration 42.509 692.396 136.766





  Amount   


 


    Heparin Sod,Pork in 0.45% 42.509 92.396 136.766





    NaCl 25,000 unit In 0.45   





    % NaCl 1 250ml.bag @ 7   





    UNITS/KG/HR 10.002 mls/hr   





    IV .Q24H [facility] Rx#:   





    [id number]   


 


    Sodium Chloride 0.9% 1,  600 





    000 ml In Empty Bag 1 bag   





    @ 1 ML/KG/.882 mls   





    /hr IV .Q7H ONE Rx#:   





    [id number]   


 


  Oral 480  


 


Output:   


 


  Urine 400  


 


Other:   


 


  Voiding Method Toilet Toilet 


 


  # Voids 3 1 














- Exam





-GENERAL: The patient is alert and oriented x3, not in any acute distress.  

Morbidly obese


HEENT: Pupils are round and equally reacting to light. EOMI. No scleral icterus.

No conjunctival pallor. Normocephalic, atraumatic. No pharyngeal erythema. No 

thyromegaly. 


CARDIOVASCULAR: S1 and S2 present. No murmurs, rubs, or gallops. 


PULMONARY: Chest is clear to auscultation, no wheezing or crackles. 


ABDOMEN: Soft, nontender, nondistended, normoactive bowel sounds. No palpable 

organomegaly. 


MUSCULOSKELETAL: No joint swelling or deformity. 


EXTREMITIES: No cyanosis, clubbing, or pedal edema. 


NEUROLOGICAL: Gross neurological examination did not reveal any focal deficits. 


SKIN: No rashes. no petechiae.





- Labs


CBC & Chem 7: 


                                 08/31/20 06:32





                                 08/31/20 06:32


Labs: 


                  Abnormal Lab Results - Last 24 Hours (Table)











  08/30/20 08/31/20 08/31/20 Range/Units





  17:16 06:32 06:32 


 


WBC   13.5 H   (3.8-10.6)  k/uL


 


Neutrophils #   9.4 H   (1.3-7.7)  k/uL


 


APTT  30.9 H    (22.0-30.0)  sec


 


Carbon Dioxide    31 H  (22-30)  mmol/L














  08/31/20 Range/Units





  06:32 


 


WBC   (3.8-10.6)  k/uL


 


Neutrophils #   (1.3-7.7)  k/uL


 


APTT  31.7 H  (22.0-30.0)  sec


 


Carbon Dioxide   (22-30)  mmol/L














Assessment and Plan


Assessment: 





None STEMI


Hypertension


Mild acute kidney injury, improved


Morbid obesity with BMI of 47


Nicotine dependence





























Plan: 





This is a pleasant 40 years old male who presents with non-STEMI, continue with 

heparin drip.  Cardiology consult, patient is planned for cardiac cath on 8/31. 

We'll check echocardiogram.  Continue with aspirin and Lipitor.


Labs and medication were reviewed..  Continue same treatment.  Continue with 

symptomatic treatment.  Resume home medication.  Monitor lytes and vitals.  DVT 

and GI prophylaxis.  Further recommendations of the clinical course of the 

patient


DVT prophylaxis:  heparin


GI Prophylaxis: Pepcid


Prognosis is guarded

## 2020-08-31 NOTE — P.PN
Subjective


Progress Note Date: 08/31/20





CHIEF COMPLAINT: Chest pain





HISTORY OF PRESENT ILLNESS:  Patient examined this morning at the bedside.  

Patient was scheduled for cardiac catheterization today with Dr. Darden. 

However, this was cancelled due to iodine allergy with previous anaphylactic 

reaction.  Patient denies chest pain.  He denies shortness of breath.





PHYSICAL EXAM: 


VITAL SIGNS: Reviewed.


GENERAL: Well-developed in no acute distress. 


NECK: Supple. No JVD or thyromegaly


LUNGS: Respirations even and unlabored. Lungs essentially clear to auscultation 

bilaterally.


HEART: Regular rate and rhythm.  S1 and S2 heard.


EXTREMITIES: Normal range of motion.  No clubbing or cyanosis.  Peripheral 

pulses intact.  No lower extremity edema





ASSESSMENT: 


Non-ST elevated myocardial infarction


Hypertension


Leukocytosis


Acute kidney injury


Chronic nicotine dependence


Dyslipidemia


Morbid obesity, BMI 47 





PLAN: 


-Continue current cardiac medications including aspirin, Lipitor, and Lopressor


-Continue IV heparin


-May resume diet today. NPO at midnight


-Begin prednisone 20 mg QID, Benadryl 25 mg TID, and Pepcid 20mg BID for iodine 

allergy


-Echo ordered. Await results.


-Patient will be rescheduled for cardiac cath tomorrow with Dr. Darden





Nurse practitioner note has been reviewed by physician. Signing provider agrees 

with the documented findings, assessment, and plan of care. 








Objective





- Vital Signs


Vital signs: 


                                   Vital Signs











Temp  97.9 F   08/31/20 04:00


 


Pulse  77   08/31/20 04:00


 


Resp  18   08/31/20 04:00


 


BP  155/89   08/31/20 04:00


 


Pulse Ox  96   08/31/20 04:00








                                 Intake & Output











 08/30/20 08/31/20 08/31/20





 18:59 06:59 18:59


 


Intake Total 522.509 692.396 115.095


 


Output Total 400  


 


Balance 122.509 692.396 115.095


 


Weight 142.882 kg 150.2 kg 


 


Intake:   


 


  Intake, IV Titration 42.509 692.396 115.095





  Amount   


 


    Heparin Sod,Pork in 0.45% 42.509 92.396 115.095





    NaCl 25,000 unit In 0.45   





    % NaCl 1 250ml.bag @ 7   





    UNITS/KG/HR 10.002 mls/hr   





    IV [facility]   





    [id number]   


 


    Sodium Chloride 0.9% 1,  600 





    000 ml In Empty Bag 1 bag   





    @ 1 ML/KG/.882 mls   





    /hr IV .Q7H ONE Rx#:   





    [id number]   


 


  Oral 480  


 


Output:   


 


  Urine 400  


 


Other:   


 


  Voiding Method Toilet Toilet 


 


  # Voids 3 1 














- Labs


CBC & Chem 7: 


                                 08/31/20 06:32





                                 08/31/20 06:32


Labs: 


                  Abnormal Lab Results - Last 24 Hours (Table)











  08/30/20 08/30/20 08/30/20 Range/Units





  10:20 10:20 10:21 


 


WBC  15.9 H    (3.8-10.6)  k/uL


 


Neutrophils #  10.4 H    (1.3-7.7)  k/uL


 


APTT   88.0 H   (22.0-30.0)  sec


 


Carbon Dioxide     (22-30)  mmol/L


 


Troponin I     (0.000-0.034)  ng/mL


 


Triglycerides    161 H  (<150)  mg/dL


 


Cholesterol    227 H  (<200)  mg/dL


 


LDL Cholesterol, Calc    170 H  (0-99)  mg/dL


 


HDL Cholesterol    25 L  (40-60)  mg/dL














  08/30/20 08/30/20 08/31/20 Range/Units





  10:21 17:16 06:32 


 


WBC    13.5 H  (3.8-10.6)  k/uL


 


Neutrophils #    9.4 H  (1.3-7.7)  k/uL


 


APTT   30.9 H   (22.0-30.0)  sec


 


Carbon Dioxide     (22-30)  mmol/L


 


Troponin I  0.154 H*    (0.000-0.034)  ng/mL


 


Triglycerides     (<150)  mg/dL


 


Cholesterol     (<200)  mg/dL


 


LDL Cholesterol, Calc     (0-99)  mg/dL


 


HDL Cholesterol     (40-60)  mg/dL














  08/31/20 08/31/20 Range/Units





  06:32 06:32 


 


WBC    (3.8-10.6)  k/uL


 


Neutrophils #    (1.3-7.7)  k/uL


 


APTT   31.7 H  (22.0-30.0)  sec


 


Carbon Dioxide  31 H   (22-30)  mmol/L


 


Troponin I    (0.000-0.034)  ng/mL


 


Triglycerides    (<150)  mg/dL


 


Cholesterol    (<200)  mg/dL


 


LDL Cholesterol, Calc    (0-99)  mg/dL


 


HDL Cholesterol    (40-60)  mg/dL

## 2020-08-31 NOTE — ECHOF
Referral Reason:cp



MEASUREMENTS

--------

HEIGHT: 172.7 cm

WEIGHT: 150.1 kg

BP: 

IVSd:   1.3 cm     (0.6 - 1.1)

LVIDd:   5.6 cm     (3.9 - 5.3)

LVPWd:   1.3 cm     (0.6 - 1.1)

EDV(Teich):   151 ml

IVSs:   1.8 cm

LVIDs:   3.9 cm

LVPWs:   1.7 cm

%IVS Thck:   31 %

ESV(Teich):   67 ml

EF(Teich):   56 %

%FS:   29 %

SV(Teich):   84 ml

LA Diam:   4.1 cm     (2.7 - 3.8)

RVIDd:   3.8 cm     (< 3.3)

Ao Diam:   3.0 cm     (2.0 - 3.7)

AV Cusp:   2.6 cm     (1.5 - 2.6)

EPSS:   0.2 cm

MV E Tyrone:   0.67 m/s

MV DecT:   155 ms

MV Dec Hill:   4.3 m/s

MV A Tyrone:   0.53 m/s

MV E/A Ratio:   1.27 

MV PHT:   45 ms

TR Vmax:   1.02 m/s

TR maxP.13 mmHg

RAP:   5.00 mmHg

RVSP:   9.13 mmHg

MV EF SLOPE:   141.97 mm/s     (70 - 150)

MV EXCURSION:   22.56 mm     (> 18.000)







FINDINGS

--------

Sinus rhythm.

This was a technically adequate study.   Morbid Obesity

The left ventricular size is normal.   There is mild concentric left ventricular hypertrophy.   Overa
ll left ventricular systolic function is low-normal with, an EF between 50 - 55 %.

The right ventricle is normal in size.   The right ventricular wall thickness is normal measuring < 5
mm.

The left atrial size is normal.

The right atrial size is normal.

The aortic valve is trileaflet, and appears structurally normal. No aortic stenosis or regurgitation.


Mild mitral regurgitation is present.

No regurgitation noted   Right ventricular systolic pressure is normal at < 35 mmHg.

The pulmonic valve was not well visualized.

The aortic root size is normal.

There is no pericardial effusion.



CONCLUSIONS

--------

1. The left ventricular size is normal.

2. Overall left ventricular systolic function is low-normal with, an EF between 50 - 55 %.

3. The right atrial size is normal.

4. Mild mitral regurgitation is present.

5. No regurgitation noted





SONOGRAPHER: Rebecca Wilson RDCS

## 2020-09-01 LAB
ANION GAP SERPL CALC-SCNC: 4 MMOL/L
BASOPHILS # BLD AUTO: 0 K/UL (ref 0–0.2)
BASOPHILS NFR BLD AUTO: 0 %
BUN SERPL-SCNC: 15 MG/DL (ref 9–20)
CALCIUM SPEC-MCNC: 9.9 MG/DL (ref 8.4–10.2)
CHLORIDE SERPL-SCNC: 101 MMOL/L (ref 98–107)
CO2 SERPL-SCNC: 30 MMOL/L (ref 22–30)
EOSINOPHIL # BLD AUTO: 0 K/UL (ref 0–0.7)
EOSINOPHIL NFR BLD AUTO: 0 %
ERYTHROCYTE [DISTWIDTH] IN BLOOD BY AUTOMATED COUNT: 5.39 M/UL (ref 4.3–5.9)
ERYTHROCYTE [DISTWIDTH] IN BLOOD: 13.3 % (ref 11.5–15.5)
GLUCOSE SERPL-MCNC: 124 MG/DL (ref 74–99)
HCT VFR BLD AUTO: 50.9 % (ref 39–53)
HGB BLD-MCNC: 15.9 GM/DL (ref 13–17.5)
LYMPHOCYTES # SPEC AUTO: 1.3 K/UL (ref 1–4.8)
LYMPHOCYTES NFR SPEC AUTO: 9 %
MCH RBC QN AUTO: 29.6 PG (ref 25–35)
MCHC RBC AUTO-ENTMCNC: 31.3 G/DL (ref 31–37)
MCV RBC AUTO: 94.5 FL (ref 80–100)
MONOCYTES # BLD AUTO: 0.6 K/UL (ref 0–1)
MONOCYTES NFR BLD AUTO: 4 %
NEUTROPHILS # BLD AUTO: 13.2 K/UL (ref 1.3–7.7)
NEUTROPHILS NFR BLD AUTO: 86 %
PLATELET # BLD AUTO: 282 K/UL (ref 150–450)
POTASSIUM SERPL-SCNC: 5.1 MMOL/L (ref 3.5–5.1)
SODIUM SERPL-SCNC: 135 MMOL/L (ref 137–145)
WBC # BLD AUTO: 15.3 K/UL (ref 3.8–10.6)

## 2020-09-01 RX ADMIN — LIDOCAINE HYDROCHLORIDE ONE ML: 10 INJECTION, SOLUTION INFILTRATION; PERINEURAL at 11:57

## 2020-09-01 RX ADMIN — METOPROLOL TARTRATE SCH: 25 TABLET, FILM COATED ORAL at 20:03

## 2020-09-01 RX ADMIN — NICOTINE SCH PATCH: 21 PATCH, EXTENDED RELEASE TRANSDERMAL at 16:20

## 2020-09-01 RX ADMIN — LIDOCAINE HYDROCHLORIDE ONE ML: 10 INJECTION, SOLUTION INFILTRATION; PERINEURAL at 12:21

## 2020-09-01 RX ADMIN — METOPROLOL TARTRATE SCH MG: 25 TABLET, FILM COATED ORAL at 06:02

## 2020-09-01 RX ADMIN — HYDRALAZINE HYDROCHLORIDE ONE MG: 20 INJECTION INTRAMUSCULAR; INTRAVENOUS at 13:12

## 2020-09-01 RX ADMIN — ATORVASTATIN CALCIUM SCH: 80 TABLET, FILM COATED ORAL at 05:56

## 2020-09-01 RX ADMIN — HEPARIN SODIUM SCH MLS/HR: 10000 INJECTION, SOLUTION INTRAVENOUS at 06:05

## 2020-09-01 RX ADMIN — FAMOTIDINE SCH MG: 20 TABLET, FILM COATED ORAL at 20:07

## 2020-09-01 RX ADMIN — ASPIRIN 81 MG CHEWABLE TABLET SCH: 81 TABLET CHEWABLE at 05:56

## 2020-09-01 RX ADMIN — NICOTINE SCH PATCH: 21 PATCH, EXTENDED RELEASE TRANSDERMAL at 06:02

## 2020-09-01 RX ADMIN — HYDRALAZINE HYDROCHLORIDE ONE MG: 20 INJECTION INTRAMUSCULAR; INTRAVENOUS at 13:20

## 2020-09-01 RX ADMIN — FAMOTIDINE SCH MG: 20 TABLET, FILM COATED ORAL at 06:02

## 2020-09-01 NOTE — PN
PROGRESS NOTE



Mr. Jerez was transferred from an outside facility with elevated troponin and chest

pain.  His cardiac cath was canceled yesterday because of IODINE allergy.  He has been

appropriately pretreated with prednisone, Benadryl and Pepcid.  The patient was advised

cardiac catheterization.  Risks, benefits, options, rationale were explained.  Dr. Darden will perform procedure. He is doing well today.



Vitals are stable, no JVD, S1, S2 heard normally.  Lungs are clear. Abdomen and lower

exam otherwise are unchanged.  He is going for a cardiac cath, possible PCI today.





MMODL / IJN: [id number] / Job#: [id number]

## 2020-09-01 NOTE — P.PN
Subjective








This is a pleasant 40-year-old years old male with past medical history of 

hypertension, not on medication.  Cigarette smoker.


Patient wasn't transferred from [facility].  He presents because of 2 

episodes of chest pain that started yesterday and felt like that this in the 

chest, radiating to the throat with left arm numbness, resolved but recurred 

again this morning at 8:00.  Patient described it as nonspecific tightness 

further that pain.  Little bit with dyspnea but no sweating or vomiting or 

palpitation or dizziness.  No fever or change in urine or bowel habits


He smokes about 1 pack per day, smokes marijuana but no illicit drugs





In [facility] the EKG showing normal sinus rhythm with no significant 

ST-T changes, rate is 83 and QTC is 420.


Left showing INR 1.0, d-dimer is elevated at 0.7, BNP is normal at 22.7 Christal 

troponin is elevated at 0.1, magnesium 2.0, sodium 140, potassium 4.3, ALT is 

normal at 37, AST is normal at 24, creatinine is slightly up at 1.4, GFR is 56, 

total bili 0.3


WBC is elevated at 14.5 K, hemoglobin 16.4, platelets 274k, 


He smokes about 1 pack per day, no alcohol or illicit drugs


Labs checked here and showing troponin 0.15, triglycerides and cholesterol are 

elevated, WBCs 15.9 K.





08/31/2020


Patient is fully awake and oriented, resting in bed comfortably.  No chest pain 

or dyspnea.  No dizziness and no other new complaints.


Vitals are stable, d-dimer is negative at 0.45 and suspicion for pulmonary 

embolism is very low and no need for further workup as it has more risks than b

enefits


Leukocytosis improvement of 13.5 K.  BMP is unremarkable.


Patient is going for cardiac cath today





09/01/2020


Patient Kala bed not in distress, he denies chest pain or dyspnea.  He is 

hemodynamically stable.  Labs showed mild leukocytosis but he is on prednisone 

20 mg for preparation for the cardiac catheter due to ALLERGY.  Patient is going

for cardiac cath today.  Cardiology following the case very closely and he is 

currently on aspirin 81 mg and Effient is added cardiologist


Patient states that his PCP is Mukesh Joiner








Review of Systems


CONSTITUTIONAL: No fever, no malaise, no fatigue. 


HEENT: No recent visual problems or hearing problems. Denied any sore throat. 


CARDIOVASCULAR: No  orthopnea, PND, no palpitations, no syncope. 


PULMONARY: No shortness of breath, no cough, no hemoptysis. 


GASTROINTESTINAL: No diarrhea, no nausea, no vomiting, no abdominal pain. 

Normoactive bowel sounds. 


NEUROLOGICAL: No headaches, no weakness, no numbness. 


HEMATOLOGICAL: Denies any bleeding or petechiae. 


GENITOURINARY: Denies any burning micturition, frequency, or urgency. 


MUSCULOSKELETAL/RHEUMATOLOGICAL: Denies any joint pain, swelling, or any muscle 

pain. 


ENDOCRINE: Denies any polyuria or polydipsia.


                               Active Medications











Generic Name Dose Route Start Last Admin





  Trade Name Freq  PRN Reason Stop Dose Admin


 


Al Hydroxide/Mg Hydroxide  30 ml  09/01/20 13:49 





  Maalox  PO  





  Q4HR PRN  





  Heartburn  


 


Alprazolam  0.25 mg  08/30/20 13:36  08/31/20 09:38





  Xanax  PO   0.25 mg





  Q6HR PRN   Administration





  Mild Anxiety  


 


Alprazolam  0.5 mg  08/30/20 13:36  09/01/20 14:34





  Xanax  PO   0.5 mg





  Q6HR PRN   Administration





  Moderate Anxiety  


 


Aspirin  81 mg  09/01/20 09:00  09/01/20 05:56





  Aspirin  PO   Not Given





  DAILY [facility]  


 


Atorvastatin Calcium  80 mg  08/31/20 09:00  09/01/20 05:56





  Lipitor  PO   Not Given





  DAILY YOVANI  


 


Atropine Sulfate  0.5 mg  09/01/20 13:49 





  Atropine  IV  





  ONCE PRN  





  Symptomatic Bradycardia  


 


Diphenhydramine HCl  25 mg  08/31/20 09:15  09/01/20 14:34





  Benadryl  PO   25 mg





  TID YOVANI   Administration


 


Famotidine  20 mg  08/31/20 09:15  09/01/20 06:02





  Pepcid  PO   20 mg





  BID YOVANI   Administration


 


Heparin Sodium/Sodium Chloride  250 mls @ 10.002 mls/hr  08/30/20 10:00  0

9/01/20 09:15





  25,000 unit/ Sodium Chloride  IV   0 units/kg/hr





  .Q24H YOVANI   0 mls/hr





    Titration





  Protocol  





  7 UNITS/KG/HR  


 


Metoprolol Tartrate  25 mg  08/30/20 10:15  09/01/20 06:02





  Lopressor  PO   25 mg





  BID YOVANI   Administration


 


Miscellaneous Information  1 each  09/01/20 13:49 





  Rx Info: Iv Contrast Was Given  MISCELLANE  09/03/20 13:49 





  DAILY PRN  





  Per Protocol  


 


Nicotine  1 patch  08/30/20 12:30  09/01/20 16:20





  Habitrol 21mg/24hr Patch  TRANSDERM   1 patch





  DAILY YOVANI   Administration


 


Nitroglycerin  0.4 mg  08/31/20 09:06 





  Nitrostat  SUBLINGUAL  





  Q5M PRN  





  Chest Pain  


 


Prasugrel  10 mg  09/02/20 09:00 





  Effient  PO  





  DAILY YOVANI  


 


Prednisone  20 mg  08/31/20 09:15  09/01/20 17:09





  PO   20 mg





  QID YOVANI   Administration


 


Zolpidem Tartrate  5 mg  09/01/20 13:49 





  Ambien  PO  





  HS PRN  





  Insomnia  














Objective





- Vital Signs


Vital signs: 


                                   Vital Signs











Temp  97.6 F   09/01/20 16:00


 


Pulse  71   09/01/20 16:00


 


Resp  18   09/01/20 16:00


 


BP  130/78   09/01/20 16:00


 


Pulse Ox  97   09/01/20 16:00








                                 Intake & Output











 08/31/20 09/01/20 09/01/20





 18:59 06:59 18:59


 


Intake Total 2306.766 3956.848 3554.444


 


Balance 2306.766 2814.108 9113.444


 


Weight  149.9 kg 


 


Intake:   


 


  IV   150


 


  Intake, IV Titration 6884.829 1568.370 1056.444





  Amount   


 


    Heparin Sod,Pork in 0.45% 136.766 453.370 81.444





    NaCl 25,000 unit In 0.45   





    % NaCl 1 250ml.bag @ 7   





    UNITS/KG/HR 10.002 mls/hr   





    IV .Q24H [facility] Rx#:   





    [id number]   


 


    Sodium Chloride 0.9% 1, 1200 1200 975





    000 ml In Empty Bag 1 bag   





    @ 1 ML/KG/.2 mls/   





    hr IV .Q6H40M ONE Rx#:   





    [id number]   


 


  Oral 970  


 


Other:   


 


  Voiding Method Toilet Toilet Toilet


 


  # Voids  2 














- Exam





-GENERAL: The patient is alert and oriented x3, not in any acute distress.  

Morbidly obese


HEENT: Pupils are round and equally reacting to light. EOMI. No scleral icterus.

No conjunctival pallor. Normocephalic, atraumatic. No pharyngeal erythema. No 

thyromegaly. 


CARDIOVASCULAR: S1 and S2 present. No murmurs, rubs, or gallops. 


PULMONARY: Chest is clear to auscultation, no wheezing or crackles. 


ABDOMEN: Soft, nontender, nondistended, normoactive bowel sounds. No palpable 

organomegaly. 


MUSCULOSKELETAL: No joint swelling or deformity. 


EXTREMITIES: No cyanosis, clubbing, or pedal edema. 


NEUROLOGICAL: Gross neurological examination did not reveal any focal deficits. 


SKIN: No rashes. no petechiae.





- Labs


CBC & Chem 7: 


                                 09/01/20 02:34





                                 09/01/20 02:34


Labs: 


                  Abnormal Lab Results - Last 24 Hours (Table)











  08/31/20 09/01/20 09/01/20 Range/Units





  18:51 02:34 02:34 


 


WBC   15.3 H   (3.8-10.6)  k/uL


 


Neutrophils #   13.2 H   (1.3-7.7)  k/uL


 


APTT  41.3 H    (22.0-30.0)  sec


 


Sodium    135 L  (137-145)  mmol/L


 


Glucose    124 H  (74-99)  mg/dL














  09/01/20 Range/Units





  02:34 


 


WBC   (3.8-10.6)  k/uL


 


Neutrophils #   (1.3-7.7)  k/uL


 


APTT  48.3 H  (22.0-30.0)  sec


 


Sodium   (137-145)  mmol/L


 


Glucose   (74-99)  mg/dL














Assessment and Plan


Assessment: 





None STEMI


Hypertension


Mild acute kidney injury, improved


Morbid obesity with BMI of 47


Nicotine dependence





























Plan: 





This is a pleasant 40 years old male who presents with non-STEMI, continue with 

heparin drip.  Cardiology consult, patient is planned for cardiac cath on 8/31. 

We'll check echocardiogram.  Continue with aspirin and Lipitor.


Labs and medication were reviewed..  Continue same treatment.  Continue with 

symptomatic treatment.  Resume home medication.  Monitor lytes and vitals.  DVT 

and GI prophylaxis.  Further recommendations of the clinical course of the 

patient


DVT prophylaxis:  heparin


GI Prophylaxis: Pepcid


Prognosis is guarded

## 2020-09-01 NOTE — P.PRCINT
Percutaneous Coronary Int.





- Percutaneous Coronary Intervention


Percutaneous Coronary Intervention: 


Date of procedure: 9/1/2020


Procedure performed by: Dr. Ge Perkins


Procedure performed: PCI of distal RCA with a 40 x 18 mm science drug-eluting 

stent, Angioseal closure


Indication: NSTEMI, CAD, tobacco abuse





History: Patient is a 40-year-old male with history of hypertension, chronic 

tobacco abuse and marijuana use who presented on 8/30/2020 with left arm and 

shoulder pain.  Patient was found to have a non-STEMI with recommendations for 

heart catheterization.








Description of procedure: After the above-mentioned risks, benefits and 

alternatives were discussed in detail with the patient, informed consent was 

obtained.   The patient had been prepped and draped in the usual fashion and 

right femoral arterial axis have been obtained with a 6 Peruvian sheath placed.  

The patient had a diagnostic left heart catheterization performed by my partner 

which showed severe distal right coronary artery stenosis of approximately 85%. 

See full left heart catheterization report for details.  I was asked to evaluate

the patient and given low syntax score and single vessel disease planned for a 

PCI of the RCA.  A 6 Peruvian AL 0.75 guide was used to engage the right coronary 

artery.  Heparin was given for an ACT over 250.  A 0.014 BMW wire was used to 

cross the lesion.  The lesion was predilated with a 3.5 x 8 mm balloon.  Next a 

4.0 x 18 mm Xience stent was deployed in the distal RCA.  The middle of the 

stent was postdilated with a 4.0 x 8 mm noncompliant balloon.  Final angiograms 

were obtained with and without the wire.  Preintervention there was 85% stenosis

with LUCHO-3 flow and post intervention there was 0% residual stenosis with LUCHO 

3 flow and no significant dissection noted.  The guide and wire were then 

removed.  A right femoral angiogram was performed which showed adequate 

positioning for closure device and a 6 Peruvian Angio-Seal was placed with 

hemostasis achieved.  Patient tolerated the procedure well.  Patient was 

transferred to the post catheterization holding area in stable and satisfactory 

condition.  





Total sedation time for interventional procedure was 31 minutes





Assessment:


1.  Non-STEMI with culprit artery right coronary artery


2.  85% distal right coronary artery stenosis status post PCI with a 40 by 18 mm

Xience drug-eluting stent





Plan:


1.  Continue dual antiplatelets for one year


2.  Aggressive risk factor modification including tobacco cessation

## 2020-09-02 VITALS
TEMPERATURE: 98.2 F | SYSTOLIC BLOOD PRESSURE: 126 MMHG | DIASTOLIC BLOOD PRESSURE: 56 MMHG | HEART RATE: 89 BPM | RESPIRATION RATE: 17 BRPM

## 2020-09-02 LAB
ANION GAP SERPL CALC-SCNC: 7 MMOL/L
BASOPHILS # BLD AUTO: 0 K/UL (ref 0–0.2)
BASOPHILS NFR BLD AUTO: 0 %
BUN SERPL-SCNC: 17 MG/DL (ref 9–20)
CALCIUM SPEC-MCNC: 9.8 MG/DL (ref 8.4–10.2)
CHLORIDE SERPL-SCNC: 103 MMOL/L (ref 98–107)
CO2 SERPL-SCNC: 28 MMOL/L (ref 22–30)
EOSINOPHIL # BLD AUTO: 0 K/UL (ref 0–0.7)
EOSINOPHIL NFR BLD AUTO: 0 %
ERYTHROCYTE [DISTWIDTH] IN BLOOD BY AUTOMATED COUNT: 5.53 M/UL (ref 4.3–5.9)
ERYTHROCYTE [DISTWIDTH] IN BLOOD: 13.3 % (ref 11.5–15.5)
GLUCOSE SERPL-MCNC: 121 MG/DL (ref 74–99)
HCT VFR BLD AUTO: 51.8 % (ref 39–53)
HGB BLD-MCNC: 16.5 GM/DL (ref 13–17.5)
LYMPHOCYTES # SPEC AUTO: 1.5 K/UL (ref 1–4.8)
LYMPHOCYTES NFR SPEC AUTO: 7 %
MAGNESIUM SPEC-SCNC: 2.1 MG/DL (ref 1.6–2.3)
MCH RBC QN AUTO: 29.7 PG (ref 25–35)
MCHC RBC AUTO-ENTMCNC: 31.8 G/DL (ref 31–37)
MCV RBC AUTO: 93.5 FL (ref 80–100)
MONOCYTES # BLD AUTO: 0.9 K/UL (ref 0–1)
MONOCYTES NFR BLD AUTO: 5 %
NEUTROPHILS # BLD AUTO: 17.4 K/UL (ref 1.3–7.7)
NEUTROPHILS NFR BLD AUTO: 86 %
PLATELET # BLD AUTO: 282 K/UL (ref 150–450)
POTASSIUM SERPL-SCNC: 5 MMOL/L (ref 3.5–5.1)
SODIUM SERPL-SCNC: 138 MMOL/L (ref 137–145)
WBC # BLD AUTO: 20.3 K/UL (ref 3.8–10.6)

## 2020-09-02 RX ADMIN — NICOTINE SCH PATCH: 21 PATCH, EXTENDED RELEASE TRANSDERMAL at 09:16

## 2020-09-02 RX ADMIN — FAMOTIDINE SCH MG: 20 TABLET, FILM COATED ORAL at 09:15

## 2020-09-02 RX ADMIN — ASPIRIN 81 MG CHEWABLE TABLET SCH MG: 81 TABLET CHEWABLE at 09:16

## 2020-09-02 RX ADMIN — ATORVASTATIN CALCIUM SCH MG: 80 TABLET, FILM COATED ORAL at 09:16

## 2020-09-02 NOTE — P.CARDCATH
Date of Procedure: 09/02/20


Preoperative Diagnosis: 


Non-STEMI


Postoperative Diagnosis: 


Critical lesion in the RCA


Procedure(s) Performed: 


Left heart catheterization without left ventriculography


Description of Procedure: 


HISTORY: This is a 40-year-old gentleman was admitted to the hospital with chest

pain and positive troponins.  He is advised to have a cardiac catheterization 

for definitive diagnosis





CONSENT:I have discussed the risks, benefits and alternative therapies for the 

above-mentioned procedure and for both sedation/analgesia as well as necessary 

blood product administration, if indicated, as they pertain to this patient.  

The patient has indicated understanding and acceptance of the risks and 

procedures discussed.











PROCEDURE: Patient was brought to the lab in a fasting state.  Patient was given

some IV sedation.  The right wrist is infiltrated and attempts are made to 

cannulate right radial artery.  The wire could not be advanced.  The procedure 

was abandoned and Was done from the right femoral approach.





The right groin is infiltrated with lidocaine and femoral vein was entered with 

a wide but no sheath was advanced.  Manual compression was applied for 

hemostasis.  Subsequently right femoral artery was entered using Seldinger 

technique.  A 6-Mauritanian catheter was left in place and selective coronary 

arteriography  was performed.  Patient tolerated the procedure well.  Patient 

went on to have stent placement of the RCA by Dr. Perkins





Conscious Sedation: Versed 2mg


Fentanyl 50 g


Duration 40minutes   


HEMODYNAMICS: The aortic pressure is about 140/70.  Left ventricular end-

diastolic pressure was not measured





SELECTIVE CORONARY ARTERIOGRAPHY: 


                          LEFT MAIN: Normal length and free of occlusive disease


                          THE LEFT ANTERIOR DESCENDING CORONARY ARTERY:.  Good 

caliber vessel free of any occlusive disease


                          THE LEFT CIRCUMFLEX AND IS CORONARY ARTERY:.  Good 

caliber vessel free of occlusive disease


                          THE RIGHT CORONARY ARTERY:.  This is a good caliber 

vessel and nondominant with about 95% stenosis in the distal portion





      





LEFT VENTRICULOGRAPHY: Not performed





FINAL IMPRESSION: Critical lesion involving the distal RCA





PLAN:stent placement of the RCA to be done by Dr. Perkins





PROGNOSIS: Fair

## 2020-09-02 NOTE — P.DS
Providers


Date of admission: 


08/30/20 09:35





Attending physician: 


Raymundo Chavez MD





Consults: 





                                        





08/30/20 09:53


Consult Physician Routine 


   Consulting Provider: Inge Darden


   Consult Reason/Comments: UA, ELEVATED TROPONIN


   Do you want consulting provider notified?: Already Contacted





09/01/20 13:49


Consult Physician Routine 


   Consulting Provider: [facility]


   Consult Reason/Comments: Post Interventional patient


   Do you want consulting provider notified?: Already Contacted











Primary care physician: 


Stated None





Hospital Course: 








Diagnoses:


None STEMI, cardiac cath showed critical lesion of the RCA, status post stent 

placement


Hypertension


Mild acute kidney injury, improved


Morbid obesity with BMI of 47


Nicotine dependence








Hospital course:


This is a pleasant 40-year-old years old male with past medical history of 

hypertension, not on medication.  Cigarette smoker.


Patient wasn't transferred from [facility].  He presents because of 2 

episodes of chest pain that started when day earlier, troponin is elevated at 

0.1.  The EKG changes.  Patient was found to have non-STEMI and started on 

heparin drip.  Cardiologist evaluated the patient and patient underwent a 

cardiac cath by Dr. Perkins and found to have critical lesion in the RCA, 

status post stent placement afterwards patient was started on aspirin and 

Effient and importance of adherent stool medication is explained to the patient 

and he verbalized understanding and acceptance.  Repeat d-dimer in the hospital 

was negative at 0.45 patient has low probability for PE.  Eventually patient to 

return clinically to his baseline and on the day of discharge he denies chest 

pain or dyspnea, no abdominal pain, tolerating diet, no change in urine or bowel

habits.  No fever.


Patient was eager to be discharged today


Patient was cleared for discharge by cardiologist


Problems and management plan were discussed with the patient and he verbalized 

understanding and acceptance


Patient was found stable and can be discharged home however he needs follow-up 

as an outpatient. Patient was instructed to follow up with PCP Dr. Mukesh Joiner within one week and patient agrees.  Also patient was instructed to 

follow up with [facility] cardiologist Dr. Darden 2 weeks and Dr. Pitt for 

sleep studies also in 2 weeks, patient agrees to call and make his own 

appointments as he told me





Gen: patient is a AAOx3, no distress


CVS: S1-S2, RRR, no murmur


Lungs: B/L CTA, no wheezing


Abdomen: soft, no distention, no tenderness, positive bowel sounds


Extremity: no leg edema or induration





Time spent more than 35 minutes






































Plan - Discharge Summary


Discharge Rx Participant: No


New Discharge Prescriptions: 


New


   Aspirin 81 mg PO DAILY #30 chew


   Prasugrel [Effient] 10 mg PO DAILY #30 tab


   Nicotine 21Mg/24Hr Patch [Habitrol] 1 patch TRANSDERM DAILY #7 patch


   Atorvastatin [Lipitor] 80 mg PO DAILY #30 tab


   Nitroglycerin Sl Tabs [Nitrostat] 0.4 mg SUBLINGUAL Q5M PRN #30 tab


     PRN Reason: Chest Pain


   Famotidine [Pepcid] 20 mg PO BID #14 tab





Continue


   ALPRAZolam [Xanax] 0.25 mg PO BID PRN


     PRN Reason: Anxiety





Discontinued


   Ibuprofen [Motrin Ib] 800 mg PO Q6H PRN


     PRN Reason: Pain


Discharge Medication List





ALPRAZolam [Xanax] 0.25 mg PO BID PRN 05/21/20 [History]


Aspirin 81 mg PO DAILY #30 chew 09/02/20 [Rx]


Atorvastatin [Lipitor] 80 mg PO DAILY #30 tab 09/02/20 [Rx]


Famotidine [Pepcid] 20 mg PO BID #14 tab 09/02/20 [Rx]


Nicotine 21Mg/24Hr Patch [Habitrol] 1 patch TRANSDERM DAILY #7 patch 09/02/20 

[Rx]


Nitroglycerin Sl Tabs [Nitrostat] 0.4 mg SUBLINGUAL Q5M PRN #30 tab 09/02/20 

[Rx]


Prasugrel [Effient] 10 mg PO DAILY #30 tab 09/02/20 [Rx]








Follow up Appointment(s)/Referral(s): 


Mukesh Joiner NPC [REFERRING] - 1 Week


Inge Darden MD [STAFF PHYSICIAN] - 2 Weeks


Frank Pitt MD [STAFF PHYSICIAN] - 2 Weeks


()


Patient Instructions/Handouts:  Heart Attack (DC), Left Heart Catheterization 

(DC)


Activity/Diet/Wound Care/Special Instructions: 


heart healthy  diet 





activity limitations post cath


do not submerge wrist or leg in water


gently clean around area, no vigorous scrubbing is needed


apply pressure when sneezing, coughing, laughing, or straining to have a bowel 

movement 


if area started bleeding apply pressure and return to hospital


do not bend/flex the wrist


do not lift any heavy objects (over 10 pounds) for 1 week


Discharge Disposition: HOME SELF-CARE

## 2020-09-02 NOTE — PN
PROGRESS NOTE



Mr. Jerez underwent stenting of distal RCA, had an episode of pause yesterday.  He

has underlying sleep apnea, whenever he sleeps, he has long pauses.  No beta blocker is

advised for this reason.  Vitals are stable.  The right groin and femoral arterial site

as well as the radial site is clean and dry with a good pulse.  Vitals are stable, no

JVD, S1, S2 heard normally.  Lungs are clear. Heart sounds are heard distantly. Abdomen

and lower extremity exam unchanged.  Patient will be discharged today, on dual

antiplatelet therapy and see Dr. Darden in one week.  Discharge instructions

regarding activity, diet and medications were given.





MMODL / IJN: [id number] / Job#: [id number]

## 2021-02-02 ENCOUNTER — HOSPITAL ENCOUNTER (OUTPATIENT)
Dept: HOSPITAL 47 - SLEEP | Age: 41
Discharge: HOME | End: 2021-02-02
Attending: INTERNAL MEDICINE
Payer: COMMERCIAL

## 2021-02-02 DIAGNOSIS — E66.01: ICD-10-CM

## 2021-02-02 DIAGNOSIS — I10: ICD-10-CM

## 2021-02-02 DIAGNOSIS — E78.5: ICD-10-CM

## 2021-02-02 DIAGNOSIS — F17.290: ICD-10-CM

## 2021-02-02 DIAGNOSIS — F41.9: ICD-10-CM

## 2021-02-02 DIAGNOSIS — L73.2: ICD-10-CM

## 2021-02-02 DIAGNOSIS — G47.33: Primary | ICD-10-CM

## 2021-02-02 DIAGNOSIS — I25.10: ICD-10-CM

## 2021-02-02 DIAGNOSIS — Z95.5: ICD-10-CM

## 2021-02-02 DIAGNOSIS — F12.90: ICD-10-CM

## 2021-02-02 PROCEDURE — 99211 OFF/OP EST MAY X REQ PHY/QHP: CPT

## 2021-02-02 NOTE — CONS
CONSULTATION



REASON FOR CONSULTATION:

Sleep apnea.



This is a 40-year-old male patient with an already established diagnosis of obstructive

sleep apnea.  The patient underwent his sleep study at [facility].  The

patient presented with typical symptoms of obstructive sleep apnea, including loud

snoring, sleep fragmentation, excessive sleep fragmentation and chronic hypersomnia and

sleepiness during the day.  Based on that, he was given a screening polysomnogram and

the patient was found to have severe obstructive sleep apnea; his overall apnea-

hypopnea index was measured to be 67 and the lowest pulse ox was 58%.  Based on that,

the patient was given a CPAP titration and he was titrated to a CPAP pressure of 10 cm

of water.  During the titration oxygen was also added at 3 L/minute nasal cannula.  He

wanted to establish herself in our office, and for that reason he came in for a

compliancy check.  Note that his polysomnogram was done on 11/2/2020.



I checked his CPAP machine.  The patient has a ResMed CPAP unit which is adjusted at a

pressure of 10 cm of water.  He has been very compliant, and over the past 30 days the

patient utilized his machine almost every night. He is achieving more than 4 hours 70%

of the time.  The patient has been averaging around 5.5 hours of CPAP use per night.

Leak is on the order of 30 L/minute and his AHI is down to 1.1.  He is using a

DreamWear under-the-nose large-sized nose mask.  He is benefitting from the treatment.

He is waking up much more refreshed and alert during the day.  His Winfield score is

gradually improving and is currently down to 15.  He is going to bed around midnight,

waking up at 8 o'clock in the morning.  No major hypersomnia or sleepiness while on

CPAP therapy and he is benefitting from the treatment.  He is trying also to lose

weight.  No nocturnal angina or chest pain.  No shortness of breath.  No nocturnal

heartburn.  No significant restlessness while on the CPAP unit.



PAST MEDICAL HISTORY:

Obstructive sleep apnea, hyperlipidemia, obesity, hypertension, chronic anxiety,

hydradenitis suppurative and coronary artery disease.



PAST SURGICAL HISTORY:

Past surgical history includes cardiac catheterization and stenting, cholecystectomy,

and removal of a pilonidal cyst.



DRUG ALLERGIES:

IODINE _____



OUTPATIENT MEDICATION LIST:

Outpatient medication list includes Effexor, atorvastatin, Xanax, Lipitor and

doxycycline.



SOCIAL HISTORY:

The patient is a smoker, one pack of cigarettes a day.  He also smokes medical

marijuana for chronic anxiety.  No history of alcoholism.  No history of IV drugs.



FAMILY HISTORY:

Strong family history of heart disease in his mother, who has coronary artery disease.

His mother also has diabetes and hypertension and she has obstructive sleep apnea.

Father is healthy.



REVIEW OF SYSTEMS:

Fourteen-point review of systems was done.  It is positive for weight gain over the

years, as the patient has gained approximately 85 pounds over the past one year.  No

history of any motor vehicle accident because of feeling drowsy or sleepy.  No history

of sleep paralysis or  hallucinations or cataplexy.  He drinks coffee, around 1-2 cups

in the morning.  No history of substance abuse or alcoholism.  No history of head

trauma.  He has chronic anxiety.  Smokes medical marijuana.  His current Winfield score

is 15.  No history of any seizure activity.  No heartburn.  No chest pain.  No

shortness of breath.  No symptoms of restlessness in the lower extremities.



PHYSICAL EXAMINATION:

BP is 109/75, pulse 97, respirations 15, temperature 98.0, saturation 96% on room air.

Height is 5 feet 6 inches. Weight is 331.  Winfield score is 15. Neck size 21 inches.

BMI 53.4.

GENERAL APPEARANCE:  Obese, calm, comfortable.

HEAD: Atraumatic, normocephalic.

NECK:  Short. Crowding in posterior pharynx is present. There is no goiter or neck

masses.

LUNGS:  Diminished. Otherwise clear.

HEART:  Heart sounds are distant, regular rate and rhythm.  Normal S1, S2.  No S3, S4.

No murmurs.

ABDOMEN:  Obese, soft, nontender.  No organomegaly.

EXTREMITIES:  No edema.  No cyanosis or clubbing.

NEUROLOGIC:  Awake and alert. There is no focal neurological deficit.

PSYCHIATRIC:  Positive for anxiety. No depression.



IMPRESSION:

1. Severe obstructive sleep apnea with an AHI of 67, currently on CPAP with a pressure

    of 10 cm of water along with oxygen at 3 L/minute nasal cannula.  The patient is

    demonstrating excellent clinical response and he is benefitting from CPAP therapy.

    His original study was done in November of 2020 at [facility].

2. Morbid obesity with a BMI of 53.

3. Chronic hypersomnia, improving.  Winfield score is down to 15.

4. Coronary artery disease with previous coronary stent insertion.

5. Hypertension.

6. Hyperlipidemia.

7. Chronic anxiety; smoking medical marijuana.

8. Tobacco smoker.

9. Hidradenitis suppurativa.



PLAN:

1. Continue CPAP therapy at the same level of pressure.

2. Compliance data was checked.  No need for any further adjustment.

3. Continue oxygen at 3 L along with CPAP therapy at a pressure of 10.

4. DreamWear under-the-nose large-size nose mask.

5. Optimize cardiovascular risk factors.

6. See me back in the office in a year's time in followup.  His treatment is

    successful for now.





MMODL / IJN: [id number] / Job#: [id number]